# Patient Record
Sex: FEMALE | Race: WHITE | URBAN - METROPOLITAN AREA
[De-identification: names, ages, dates, MRNs, and addresses within clinical notes are randomized per-mention and may not be internally consistent; named-entity substitution may affect disease eponyms.]

---

## 2019-03-13 ENCOUNTER — APPOINTMENT (OUTPATIENT)
Dept: FAMILY MEDICINE | Age: 49
End: 2019-03-13

## 2019-03-22 ENCOUNTER — OFFICE VISIT (OUTPATIENT)
Dept: FAMILY MEDICINE | Age: 49
End: 2019-03-22

## 2019-03-22 VITALS
BODY MASS INDEX: 44.93 KG/M2 | DIASTOLIC BLOOD PRESSURE: 77 MMHG | HEART RATE: 84 BPM | HEIGHT: 62 IN | OXYGEN SATURATION: 98 % | WEIGHT: 244.16 LBS | SYSTOLIC BLOOD PRESSURE: 138 MMHG | RESPIRATION RATE: 18 BRPM

## 2019-03-22 DIAGNOSIS — M25.562 ACUTE PAIN OF BOTH KNEES: Primary | ICD-10-CM

## 2019-03-22 DIAGNOSIS — M25.561 ACUTE PAIN OF BOTH KNEES: Primary | ICD-10-CM

## 2019-03-22 PROCEDURE — 99204 OFFICE O/P NEW MOD 45 MIN: CPT | Performed by: FAMILY MEDICINE

## 2019-03-22 RX ORDER — NAPROXEN 500 MG/1
500 TABLET ORAL 2 TIMES DAILY
Qty: 30 TABLET | Refills: 1 | Status: SHIPPED | OUTPATIENT
Start: 2019-03-22 | End: 2019-04-06

## 2019-03-22 SDOH — HEALTH STABILITY: MENTAL HEALTH: HOW OFTEN DO YOU HAVE A DRINK CONTAINING ALCOHOL?: NEVER

## 2019-03-22 ASSESSMENT — ENCOUNTER SYMPTOMS
ABDOMINAL PAIN: 0
CONFUSION: 0
FEVER: 0
HALLUCINATIONS: 0
SHORTNESS OF BREATH: 0
VOMITING: 0
TREMORS: 0
SEIZURES: 0

## 2020-02-28 ENCOUNTER — OFFICE VISIT (OUTPATIENT)
Dept: FAMILY MEDICINE | Age: 50
End: 2020-02-28

## 2020-02-28 ENCOUNTER — IMAGING SERVICES (OUTPATIENT)
Dept: MAMMOGRAPHY | Age: 50
End: 2020-02-28
Attending: FAMILY MEDICINE

## 2020-02-28 ENCOUNTER — LAB SERVICES (OUTPATIENT)
Dept: LAB | Age: 50
End: 2020-02-28

## 2020-02-28 VITALS
HEART RATE: 64 BPM | RESPIRATION RATE: 20 BRPM | TEMPERATURE: 97.9 F | BODY MASS INDEX: 47 KG/M2 | WEIGHT: 255.4 LBS | HEIGHT: 62 IN | DIASTOLIC BLOOD PRESSURE: 66 MMHG | SYSTOLIC BLOOD PRESSURE: 131 MMHG

## 2020-02-28 DIAGNOSIS — Z12.31 VISIT FOR SCREENING MAMMOGRAM: ICD-10-CM

## 2020-02-28 DIAGNOSIS — Z00.00 HEALTH MAINTENANCE EXAMINATION: ICD-10-CM

## 2020-02-28 DIAGNOSIS — Z81.8 FAMILY HISTORY OF DEMENTIA: Primary | ICD-10-CM

## 2020-02-28 DIAGNOSIS — Z23 NEED FOR VACCINATION: ICD-10-CM

## 2020-02-28 DIAGNOSIS — Z81.8 FAMILY HISTORY OF DEMENTIA: ICD-10-CM

## 2020-02-28 DIAGNOSIS — Z12.11 COLON CANCER SCREENING: ICD-10-CM

## 2020-02-28 LAB
ALBUMIN SERPL-MCNC: 3.6 G/DL (ref 3.6–5.1)
ALBUMIN/GLOB SERPL: 0.9 {RATIO} (ref 1–2.4)
ALP SERPL-CCNC: 89 UNITS/L (ref 45–117)
ALT SERPL-CCNC: 23 UNITS/L
ANION GAP SERPL CALC-SCNC: 8 MMOL/L (ref 10–20)
AST SERPL-CCNC: 11 UNITS/L
BASOPHILS # BLD: 0.1 K/MCL (ref 0–0.3)
BASOPHILS NFR BLD: 1 %
BILIRUB SERPL-MCNC: 0.2 MG/DL (ref 0.2–1)
BUN SERPL-MCNC: 14 MG/DL (ref 6–20)
BUN/CREAT SERPL: 22 (ref 7–25)
CALCIUM SERPL-MCNC: 9.7 MG/DL (ref 8.4–10.2)
CHLORIDE SERPL-SCNC: 106 MMOL/L (ref 98–107)
CO2 SERPL-SCNC: 28 MMOL/L (ref 21–32)
CREAT SERPL-MCNC: 0.64 MG/DL (ref 0.51–0.95)
DIFFERENTIAL METHOD BLD: NORMAL
EOSINOPHIL # BLD: 0.3 K/MCL (ref 0.1–0.5)
EOSINOPHIL NFR BLD: 3 %
ERYTHROCYTE [DISTWIDTH] IN BLOOD: 12.8 % (ref 11–15)
GLOBULIN SER-MCNC: 4 G/DL (ref 2–4)
GLUCOSE SERPL-MCNC: 94 MG/DL (ref 65–99)
HCT VFR BLD CALC: 40.6 % (ref 36–46.5)
HGB BLD-MCNC: 13.1 G/DL (ref 12–15.5)
IMM GRANULOCYTES # BLD AUTO: 0 K/MCL (ref 0–0.2)
IMM GRANULOCYTES NFR BLD: 0 %
LENGTH OF FAST TIME PATIENT: 4 HRS
LYMPHOCYTES # BLD: 1.9 K/MCL (ref 1–4)
LYMPHOCYTES NFR BLD: 20 %
MCH RBC QN AUTO: 28.9 PG (ref 26–34)
MCHC RBC AUTO-ENTMCNC: 32.3 G/DL (ref 32–36.5)
MCV RBC AUTO: 89.4 FL (ref 78–100)
MONOCYTES # BLD: 0.7 K/MCL (ref 0.3–0.9)
MONOCYTES NFR BLD: 8 %
NEUTROPHILS # BLD: 6.4 K/MCL (ref 1.8–7.7)
NEUTROPHILS NFR BLD: 68 %
NRBC BLD MANUAL-RTO: 0 /100 WBC
PLATELET # BLD: 367 K/MCL (ref 140–450)
POTASSIUM SERPL-SCNC: 4.5 MMOL/L (ref 3.4–5.1)
PROT SERPL-MCNC: 7.6 G/DL (ref 6.4–8.2)
RBC # BLD: 4.54 MIL/MCL (ref 4–5.2)
SODIUM SERPL-SCNC: 138 MMOL/L (ref 135–145)
WBC # BLD: 9.4 K/MCL (ref 4.2–11)

## 2020-02-28 PROCEDURE — 77067 SCR MAMMO BI INCL CAD: CPT | Performed by: EMERGENCY MEDICINE

## 2020-02-28 PROCEDURE — 99396 PREV VISIT EST AGE 40-64: CPT | Performed by: FAMILY MEDICINE

## 2020-02-28 PROCEDURE — 80053 COMPREHEN METABOLIC PANEL: CPT | Performed by: FAMILY MEDICINE

## 2020-02-28 PROCEDURE — 85025 COMPLETE CBC W/AUTO DIFF WBC: CPT | Performed by: FAMILY MEDICINE

## 2020-02-28 PROCEDURE — 77063 BREAST TOMOSYNTHESIS BI: CPT | Performed by: EMERGENCY MEDICINE

## 2020-02-28 PROCEDURE — 36415 COLL VENOUS BLD VENIPUNCTURE: CPT | Performed by: FAMILY MEDICINE

## 2020-02-28 SDOH — HEALTH STABILITY: MENTAL HEALTH: HOW OFTEN DO YOU HAVE A DRINK CONTAINING ALCOHOL?: NEVER

## 2020-02-28 ASSESSMENT — ENCOUNTER SYMPTOMS
TREMORS: 0
HALLUCINATIONS: 0
SEIZURES: 0
VOMITING: 0
ABDOMINAL PAIN: 0
SHORTNESS OF BREATH: 0
FEVER: 0
CONFUSION: 0

## 2020-03-03 ENCOUNTER — TELEPHONE (OUTPATIENT)
Dept: FAMILY MEDICINE | Age: 50
End: 2020-03-03

## 2020-03-03 DIAGNOSIS — R92.8 ABNORMAL MAMMOGRAM: Primary | ICD-10-CM

## 2020-03-12 ENCOUNTER — TELEPHONE (OUTPATIENT)
Dept: FAMILY MEDICINE | Age: 50
End: 2020-03-12

## 2020-03-12 ENCOUNTER — HOSPITAL (OUTPATIENT)
Dept: OTHER | Age: 50
End: 2020-03-12
Attending: FAMILY MEDICINE

## 2020-03-13 ENCOUNTER — TELEPHONE (OUTPATIENT)
Dept: FAMILY MEDICINE | Age: 50
End: 2020-03-13

## 2020-03-27 DIAGNOSIS — R92.8 ABNORMAL MAMMOGRAM: ICD-10-CM

## 2020-07-08 ENCOUNTER — TELEPHONE (OUTPATIENT)
Dept: FAMILY MEDICINE | Age: 50
End: 2020-07-08

## 2020-09-25 ENCOUNTER — IMMUNIZATION (OUTPATIENT)
Dept: FAMILY MEDICINE | Age: 50
End: 2020-09-25

## 2020-09-25 VITALS — TEMPERATURE: 98.8 F

## 2020-09-25 DIAGNOSIS — Z23 NEED FOR IMMUNIZATION AGAINST INFLUENZA: Primary | ICD-10-CM

## 2020-09-25 PROCEDURE — 90686 IIV4 VACC NO PRSV 0.5 ML IM: CPT

## 2020-09-25 PROCEDURE — 90471 IMMUNIZATION ADMIN: CPT

## 2021-02-04 ENCOUNTER — TELEPHONE (OUTPATIENT)
Dept: FAMILY MEDICINE | Age: 51
End: 2021-02-04

## 2022-04-14 LAB
CYTOLOGY CVX/VAG DOC THIN PREP: NORMAL
HPV16+18+45 E6+E7MRNA CVX NAA+PROBE: NEGATIVE

## 2022-05-31 ENCOUNTER — TELEPHONE (OUTPATIENT)
Dept: OBGYN | Age: 52
End: 2022-05-31

## 2022-07-25 ENCOUNTER — APPOINTMENT (OUTPATIENT)
Dept: OBGYN | Age: 52
End: 2022-07-25

## 2023-07-25 ENCOUNTER — CLINICAL ABSTRACT (OUTPATIENT)
Dept: CARE COORDINATION | Age: 53
End: 2023-07-25

## 2024-10-28 ENCOUNTER — HOSPITAL ENCOUNTER (INPATIENT)
Facility: HOSPITAL | Age: 54
LOS: 1 days | Discharge: HOME | DRG: 418 | End: 2024-10-30
Attending: EMERGENCY MEDICINE | Admitting: HOSPITALIST
Payer: COMMERCIAL

## 2024-10-28 ENCOUNTER — APPOINTMENT (OUTPATIENT)
Dept: ULTRASOUND IMAGING | Age: 54
DRG: 418 | End: 2024-10-28
Attending: EMERGENCY MEDICINE
Payer: COMMERCIAL

## 2024-10-28 ENCOUNTER — APPOINTMENT (OUTPATIENT)
Dept: ULTRASOUND IMAGING | Age: 54
End: 2024-10-28
Attending: EMERGENCY MEDICINE
Payer: COMMERCIAL

## 2024-10-28 ENCOUNTER — HOSPITAL ENCOUNTER (INPATIENT)
Facility: HOSPITAL | Age: 54
LOS: 1 days | Discharge: HOME | End: 2024-10-30
Attending: EMERGENCY MEDICINE | Admitting: HOSPITALIST
Payer: COMMERCIAL

## 2024-10-28 DIAGNOSIS — K85.90 ACUTE PANCREATITIS, UNSPECIFIED COMPLICATION STATUS, UNSPECIFIED PANCREATITIS TYPE (HCC): Primary | ICD-10-CM

## 2024-10-28 DIAGNOSIS — K80.20 CHOLELITHIASIS: ICD-10-CM

## 2024-10-28 PROBLEM — R10.13 EPIGASTRIC PAIN: Status: ACTIVE | Noted: 2024-10-28

## 2024-10-28 LAB
ALBUMIN SERPL-MCNC: 3.8 G/DL (ref 3.4–5)
ALBUMIN/GLOB SERPL: 0.9 {RATIO} (ref 1–2)
ALP LIVER SERPL-CCNC: 112 U/L
ALT SERPL-CCNC: 135 U/L
ANION GAP SERPL CALC-SCNC: 7 MMOL/L (ref 0–18)
AST SERPL-CCNC: 117 U/L (ref 15–37)
BASOPHILS # BLD AUTO: 0.04 X10(3) UL (ref 0–0.2)
BASOPHILS NFR BLD AUTO: 0.3 %
BILIRUB SERPL-MCNC: 1.1 MG/DL (ref 0.1–2)
BILIRUB UR QL CFM: NEGATIVE
BUN BLD-MCNC: 22 MG/DL (ref 9–23)
CALCIUM BLD-MCNC: 10 MG/DL (ref 8.5–10.1)
CHLORIDE SERPL-SCNC: 104 MMOL/L (ref 98–112)
CLARITY UR REFRACT.AUTO: CLEAR
CO2 SERPL-SCNC: 24 MMOL/L (ref 21–32)
COLOR UR AUTO: YELLOW
CREAT BLD-MCNC: 0.82 MG/DL
EGFRCR SERPLBLD CKD-EPI 2021: 84 ML/MIN/1.73M2 (ref 60–?)
EOSINOPHIL # BLD AUTO: 0.01 X10(3) UL (ref 0–0.7)
EOSINOPHIL NFR BLD AUTO: 0.1 %
ERYTHROCYTE [DISTWIDTH] IN BLOOD BY AUTOMATED COUNT: 13 %
GLOBULIN PLAS-MCNC: 4.3 G/DL (ref 2.8–4.4)
GLUCOSE BLD-MCNC: 134 MG/DL (ref 70–99)
GLUCOSE UR STRIP.AUTO-MCNC: NEGATIVE MG/DL
HCT VFR BLD AUTO: 41 %
HGB BLD-MCNC: 14.6 G/DL
IMM GRANULOCYTES # BLD AUTO: 0.07 X10(3) UL (ref 0–1)
IMM GRANULOCYTES NFR BLD: 0.5 %
KETONES UR STRIP.AUTO-MCNC: 40 MG/DL
LEUKOCYTE ESTERASE UR QL STRIP.AUTO: NEGATIVE
LIPASE SERPL-CCNC: 8867 U/L (ref 12–53)
LYMPHOCYTES # BLD AUTO: 0.84 X10(3) UL (ref 1–4)
LYMPHOCYTES NFR BLD AUTO: 6.3 %
MCH RBC QN AUTO: 30.6 PG (ref 26–34)
MCHC RBC AUTO-ENTMCNC: 35.6 G/DL (ref 31–37)
MCV RBC AUTO: 86 FL
MONOCYTES # BLD AUTO: 0.49 X10(3) UL (ref 0.1–1)
MONOCYTES NFR BLD AUTO: 3.7 %
NEUTROPHILS # BLD AUTO: 11.81 X10 (3) UL (ref 1.5–7.7)
NEUTROPHILS # BLD AUTO: 11.81 X10(3) UL (ref 1.5–7.7)
NEUTROPHILS NFR BLD AUTO: 89.1 %
NITRITE UR QL STRIP.AUTO: NEGATIVE
OSMOLALITY SERPL CALC.SUM OF ELEC: 285 MOSM/KG (ref 275–295)
PH UR STRIP.AUTO: 6.5 [PH] (ref 5–8)
PLATELET # BLD AUTO: 296 10(3)UL (ref 150–450)
POTASSIUM SERPL-SCNC: 3.4 MMOL/L (ref 3.5–5.1)
PROT SERPL-MCNC: 8.1 G/DL (ref 6.4–8.2)
RBC # BLD AUTO: 4.77 X10(6)UL
RBC UR QL AUTO: NEGATIVE
SODIUM SERPL-SCNC: 135 MMOL/L (ref 136–145)
SP GR UR STRIP.AUTO: 1.02 (ref 1–1.03)
TROPONIN I SERPL HS-MCNC: 13 NG/L
UROBILINOGEN UR STRIP.AUTO-MCNC: 1 MG/DL
WBC # BLD AUTO: 13.3 X10(3) UL (ref 4–11)

## 2024-10-28 PROCEDURE — 76700 US EXAM ABDOM COMPLETE: CPT | Performed by: EMERGENCY MEDICINE

## 2024-10-28 PROCEDURE — 99223 1ST HOSP IP/OBS HIGH 75: CPT | Performed by: STUDENT IN AN ORGANIZED HEALTH CARE EDUCATION/TRAINING PROGRAM

## 2024-10-28 RX ORDER — HYDROMORPHONE HYDROCHLORIDE 1 MG/ML
0.5 INJECTION, SOLUTION INTRAMUSCULAR; INTRAVENOUS; SUBCUTANEOUS EVERY 30 MIN PRN
Status: DISCONTINUED | OUTPATIENT
Start: 2024-10-28 | End: 2024-10-29

## 2024-10-28 RX ORDER — MORPHINE SULFATE 4 MG/ML
4 INJECTION, SOLUTION INTRAMUSCULAR; INTRAVENOUS ONCE
Status: COMPLETED | OUTPATIENT
Start: 2024-10-28 | End: 2024-10-28

## 2024-10-28 RX ORDER — SODIUM CHLORIDE, SODIUM LACTATE, POTASSIUM CHLORIDE, CALCIUM CHLORIDE 600; 310; 30; 20 MG/100ML; MG/100ML; MG/100ML; MG/100ML
INJECTION, SOLUTION INTRAVENOUS ONCE
Status: COMPLETED | OUTPATIENT
Start: 2024-10-28 | End: 2024-10-29

## 2024-10-28 RX ORDER — KETOROLAC TROMETHAMINE 15 MG/ML
15 INJECTION, SOLUTION INTRAMUSCULAR; INTRAVENOUS ONCE
Status: COMPLETED | OUTPATIENT
Start: 2024-10-28 | End: 2024-10-28

## 2024-10-28 RX ORDER — ONDANSETRON 2 MG/ML
4 INJECTION INTRAMUSCULAR; INTRAVENOUS ONCE
Status: DISCONTINUED | OUTPATIENT
Start: 2024-10-28 | End: 2024-10-28

## 2024-10-28 RX ORDER — FAMOTIDINE 10 MG/ML
20 INJECTION, SOLUTION INTRAVENOUS ONCE
Status: COMPLETED | OUTPATIENT
Start: 2024-10-28 | End: 2024-10-28

## 2024-10-28 RX ORDER — ONDANSETRON 2 MG/ML
4 INJECTION INTRAMUSCULAR; INTRAVENOUS ONCE
Status: COMPLETED | OUTPATIENT
Start: 2024-10-28 | End: 2024-10-28

## 2024-10-28 NOTE — ED INITIAL ASSESSMENT (HPI)
C/O upper abd pain. States was at [facility] yesterday for same. Had blood work, EKG and ct. No dx given, no prescriptions. States pain returned at 3 am. C/O nausea and vomiting. Denies changes in urine or stool.

## 2024-10-29 PROBLEM — K85.90 ACUTE PANCREATITIS, UNSPECIFIED COMPLICATION STATUS, UNSPECIFIED PANCREATITIS TYPE (HCC): Status: ACTIVE | Noted: 2024-10-29

## 2024-10-29 LAB
ALBUMIN SERPL-MCNC: 3.7 G/DL (ref 3.2–4.8)
ALBUMIN/GLOB SERPL: 1.3 {RATIO} (ref 1–2)
ALP LIVER SERPL-CCNC: 92 U/L
ALT SERPL-CCNC: 122 U/L
ANION GAP SERPL CALC-SCNC: 5 MMOL/L (ref 0–18)
AST SERPL-CCNC: 113 U/L (ref ?–34)
ATRIAL RATE: 58 BPM
BASOPHILS # BLD AUTO: 0.03 X10(3) UL (ref 0–0.2)
BASOPHILS NFR BLD AUTO: 0.3 %
BILIRUB SERPL-MCNC: 1.2 MG/DL (ref 0.3–1.2)
BUN BLD-MCNC: 21 MG/DL (ref 9–23)
CALCIUM BLD-MCNC: 9.1 MG/DL (ref 8.7–10.4)
CHLORIDE SERPL-SCNC: 110 MMOL/L (ref 98–112)
CO2 SERPL-SCNC: 24 MMOL/L (ref 21–32)
CREAT BLD-MCNC: 0.68 MG/DL
EGFRCR SERPLBLD CKD-EPI 2021: 102 ML/MIN/1.73M2 (ref 60–?)
EOSINOPHIL # BLD AUTO: 0 X10(3) UL (ref 0–0.7)
EOSINOPHIL NFR BLD AUTO: 0 %
ERYTHROCYTE [DISTWIDTH] IN BLOOD BY AUTOMATED COUNT: 13 %
GLOBULIN PLAS-MCNC: 2.9 G/DL (ref 2–3.5)
GLUCOSE BLD-MCNC: 120 MG/DL (ref 70–99)
HCT VFR BLD AUTO: 36.5 %
HGB BLD-MCNC: 12.2 G/DL
IMM GRANULOCYTES # BLD AUTO: 0.04 X10(3) UL (ref 0–1)
IMM GRANULOCYTES NFR BLD: 0.4 %
LYMPHOCYTES # BLD AUTO: 1.21 X10(3) UL (ref 1–4)
LYMPHOCYTES NFR BLD AUTO: 11.7 %
MAGNESIUM SERPL-MCNC: 2 MG/DL (ref 1.6–2.6)
MCH RBC QN AUTO: 29.9 PG (ref 26–34)
MCHC RBC AUTO-ENTMCNC: 33.4 G/DL (ref 31–37)
MCV RBC AUTO: 89.5 FL
MONOCYTES # BLD AUTO: 0.78 X10(3) UL (ref 0.1–1)
MONOCYTES NFR BLD AUTO: 7.6 %
NEUTROPHILS # BLD AUTO: 8.25 X10 (3) UL (ref 1.5–7.7)
NEUTROPHILS # BLD AUTO: 8.25 X10(3) UL (ref 1.5–7.7)
NEUTROPHILS NFR BLD AUTO: 80 %
OSMOLALITY SERPL CALC.SUM OF ELEC: 292 MOSM/KG (ref 275–295)
P AXIS: 31 DEGREES
P-R INTERVAL: 154 MS
PLATELET # BLD AUTO: 240 10(3)UL (ref 150–450)
POTASSIUM SERPL-SCNC: 3.4 MMOL/L (ref 3.5–5.1)
PROT SERPL-MCNC: 6.6 G/DL (ref 5.7–8.2)
Q-T INTERVAL: 480 MS
QRS DURATION: 134 MS
QTC CALCULATION (BEZET): 471 MS
R AXIS: -52 DEGREES
RBC # BLD AUTO: 4.08 X10(6)UL
SODIUM SERPL-SCNC: 139 MMOL/L (ref 136–145)
T AXIS: 59 DEGREES
TRIGL SERPL-MCNC: 72 MG/DL (ref 30–149)
TRIGL SERPL-MCNC: 80 MG/DL (ref 30–149)
VENTRICULAR RATE: 58 BPM
WBC # BLD AUTO: 10.3 X10(3) UL (ref 4–11)

## 2024-10-29 RX ORDER — HYDROMORPHONE HYDROCHLORIDE 1 MG/ML
0.4 INJECTION, SOLUTION INTRAMUSCULAR; INTRAVENOUS; SUBCUTANEOUS EVERY 4 HOURS PRN
Status: DISCONTINUED | OUTPATIENT
Start: 2024-10-29 | End: 2024-10-30

## 2024-10-29 RX ORDER — ECHINACEA PURPUREA EXTRACT 125 MG
1 TABLET ORAL
Status: DISCONTINUED | OUTPATIENT
Start: 2024-10-29 | End: 2024-10-30

## 2024-10-29 RX ORDER — BISACODYL 10 MG
10 SUPPOSITORY, RECTAL RECTAL
Status: DISCONTINUED | OUTPATIENT
Start: 2024-10-29 | End: 2024-10-30

## 2024-10-29 RX ORDER — ONDANSETRON 2 MG/ML
4 INJECTION INTRAMUSCULAR; INTRAVENOUS EVERY 6 HOURS PRN
Status: DISCONTINUED | OUTPATIENT
Start: 2024-10-29 | End: 2024-10-30

## 2024-10-29 RX ORDER — MULTIVIT-MIN/IRON/FOLIC ACID/K 18-600-40
2000 CAPSULE ORAL DAILY
COMMUNITY

## 2024-10-29 RX ORDER — PROCHLORPERAZINE EDISYLATE 5 MG/ML
5 INJECTION INTRAMUSCULAR; INTRAVENOUS EVERY 8 HOURS PRN
Status: DISCONTINUED | OUTPATIENT
Start: 2024-10-29 | End: 2024-10-30

## 2024-10-29 RX ORDER — MULTIVIT-MIN/IRON FUM/FOLIC AC 7.5 MG-4
1 TABLET ORAL DAILY
COMMUNITY

## 2024-10-29 RX ORDER — POLYETHYLENE GLYCOL 3350 17 G/17G
17 POWDER, FOR SOLUTION ORAL DAILY PRN
Status: DISCONTINUED | OUTPATIENT
Start: 2024-10-29 | End: 2024-10-30

## 2024-10-29 RX ORDER — SENNOSIDES 8.6 MG
17.2 TABLET ORAL NIGHTLY PRN
Status: DISCONTINUED | OUTPATIENT
Start: 2024-10-29 | End: 2024-10-30

## 2024-10-29 RX ORDER — ACETAMINOPHEN 500 MG
500 TABLET ORAL EVERY 4 HOURS PRN
Status: DISCONTINUED | OUTPATIENT
Start: 2024-10-29 | End: 2024-10-30

## 2024-10-29 RX ORDER — MULTIPLE VITAMINS W/ MINERALS TAB 9MG-400MCG
1 TAB ORAL DAILY
Status: DISCONTINUED | OUTPATIENT
Start: 2024-10-29 | End: 2024-10-30

## 2024-10-29 RX ORDER — CHOLECALCIFEROL (VITAMIN D3) 25 MCG
2000 TABLET ORAL DAILY
Status: DISCONTINUED | OUTPATIENT
Start: 2024-10-29 | End: 2024-10-30

## 2024-10-29 RX ORDER — SODIUM CHLORIDE 9 MG/ML
INJECTION, SOLUTION INTRAVENOUS CONTINUOUS
Status: DISCONTINUED | OUTPATIENT
Start: 2024-10-29 | End: 2024-10-30

## 2024-10-29 RX ORDER — POTASSIUM CHLORIDE 1500 MG/1
40 TABLET, EXTENDED RELEASE ORAL ONCE
Status: COMPLETED | OUTPATIENT
Start: 2024-10-29 | End: 2024-10-29

## 2024-10-29 RX ORDER — BENZONATATE 200 MG/1
200 CAPSULE ORAL 3 TIMES DAILY PRN
Status: DISCONTINUED | OUTPATIENT
Start: 2024-10-29 | End: 2024-10-30

## 2024-10-29 RX ORDER — HYDROMORPHONE HYDROCHLORIDE 1 MG/ML
0.5 INJECTION, SOLUTION INTRAMUSCULAR; INTRAVENOUS; SUBCUTANEOUS ONCE
Status: COMPLETED | OUTPATIENT
Start: 2024-10-29 | End: 2024-10-29

## 2024-10-29 RX ORDER — HYDROMORPHONE HYDROCHLORIDE 1 MG/ML
0.8 INJECTION, SOLUTION INTRAMUSCULAR; INTRAVENOUS; SUBCUTANEOUS EVERY 4 HOURS PRN
Status: DISCONTINUED | OUTPATIENT
Start: 2024-10-29 | End: 2024-10-30

## 2024-10-29 RX ORDER — SODIUM PHOSPHATE, DIBASIC AND SODIUM PHOSPHATE, MONOBASIC 7; 19 G/230ML; G/230ML
1 ENEMA RECTAL ONCE AS NEEDED
Status: DISCONTINUED | OUTPATIENT
Start: 2024-10-29 | End: 2024-10-30

## 2024-10-29 RX ORDER — ENOXAPARIN SODIUM 100 MG/ML
40 INJECTION SUBCUTANEOUS DAILY
Status: DISCONTINUED | OUTPATIENT
Start: 2024-10-29 | End: 2024-10-30

## 2024-10-29 RX ORDER — HYDROMORPHONE HYDROCHLORIDE 1 MG/ML
0.2 INJECTION, SOLUTION INTRAMUSCULAR; INTRAVENOUS; SUBCUTANEOUS EVERY 4 HOURS PRN
Status: DISCONTINUED | OUTPATIENT
Start: 2024-10-29 | End: 2024-10-30

## 2024-10-29 NOTE — H&P
[facility]     Valeria Hutchins Patient Status:  Inpatient    1970 MRN [id number]   Location [facility] 0SW-A Attending Mak Powell*   Hosp Day # 0 PCP None Pcp     Chief Complaint: abdominal pain    Subjective:    History of Present Illness:     Valeria Hutchins is a 54 year old female with no PMHx who presented to the hospital for abdominal pain . Her pain began 2 days ago and was a sharp knifing pain in her epigastric region that radiated to her back. It was rated 4-10/10 with no specific alleviating or exacerbating factors. She had associated nausea without emesis and was not able to eat or drink much as a result. She denied any fever, chills, diarrhea, constipation. She denied prior issues with her gallbladder.    History/Other:    Past Medical History:  History reviewed. No pertinent past medical history.  Past Surgical History:   History reviewed. No pertinent surgical history.   Family History:   History reviewed. No pertinent family history.  Social History:    reports that she has never smoked. She has never used smokeless tobacco. She reports that she does not drink alcohol and does not use drugs.     Allergies: Allergies[1]    Medications:  Medications Ordered Prior to Encounter[2]    Review of Systems:   A comprehensive review of systems was completed.    Pertinent positives and negatives noted in the HPI.    Objective:   Physical Exam:    /54   Pulse 66   Temp 97.8 °F (36.6 °C) (Oral)   Resp 17   Ht 5' 2\" (1.575 m)   Wt 216 lb (98 kg)   SpO2 97%   BMI 39.51 kg/m²   General: No acute distress, Alert  Respiratory: No rhonchi, no wheezes, on room  air  Cardiovascular: S1, S2. Regular rate and rhythm  Abdomen: Soft, Non-tender, non-distended, positive bowel sounds  Neuro: No new focal deficits  Extremities: trace bl pre-tibial edema    Results:    Labs:      Labs Last 24 Hours:    Recent Labs   Lab 10/28/24  1939   RBC 4.77   HGB 14.6   HCT  41.0   MCV 86.0   MCH 30.6   MCHC 35.6   RDW 13.0   NEPRELIM 11.81*   WBC 13.3*   .0       Recent Labs   Lab 10/28/24  1939   *   BUN 22   CREATSERUM 0.82   EGFRCR 84   CA 10.0   ALB 3.8   *   K 3.4*      CO2 24.0   ALKPHO 112   *   *   BILT 1.1   TP 8.1       No results found for: \"PT\", \"INR\"    Recent Labs   Lab 10/28/24  1939   TROPHS 13       No results for input(s): \"TROP\", \"PBNP\" in the last 168 hours.    No results for input(s): \"PCT\" in the last 168 hours.    Imaging: Imaging data reviewed in Epic.    Assessment & Plan:      #Pancreatitis  -, , lipase 8867  -US showing cholelithiasis without cholecystitis  -pancreatitis with unclear etiology, possibly 2/2 gallstones vs Sphincter of Oddi dysfunction vs other  -check TG level  -liquid diet  -pain control: dilaudid prn  -zofran prn  -cont to monitor LFT's  -pt will need eventual FU with surgery for elective cholecystectomy    #hypokalemia  -K 3.4  -replete  -cont to trend BMP  -check Mg level    #leukocytosis  -WBC 13.3  -likely 2/2 acute stress  -cont to monitor CBC        Plan of care discussed with ED physician    Siobhan Vang DO    Supplementary Documentation:     The 21st Century Cures Act makes medical notes like these available to patients in the interest of transparency. Please be advised this is a medical document. Medical documents are intended to carry relevant information, facts as evident, and the clinical opinion of the practitioner. The medical note is intended as peer to peer communication and may appear blunt or direct. It is written in medical language and may contain abbreviations or verbiage that are unfamiliar.                                       [1] No Known Allergies  [2]   No current facility-administered medications on file prior to encounter.     Current Outpatient Medications on File Prior to Encounter   Medication Sig Dispense Refill    Multiple Vitamins-Minerals  (MULTI-VITAMIN/MINERALS) Oral Tab Take 1 tablet by mouth daily.      Cholecalciferol (VITAMIN D) 50 MCG (2000 UT) Oral Cap Take 1 capsule (2,000 Units total) by mouth daily.

## 2024-10-29 NOTE — PLAN OF CARE
Pt from home w/ children  Aox4  VSS on RA/2L NOC  afebrile  no tele, receiving lovenox  Electrolyte non-cardiac replacement  continent  Up self, call light within reach  Clear liquid diet  Receiving IVF  PRN pain and nausea meds given as needed, see MAR  Pt updated on current POC, no questions at this time    Problem: Patient/Family Goals  Goal: Patient/Family Long Term Goal  Description: Patient's Long Term Goal: DC    Interventions:  - IVF  - Pain management  - See additional Care Plan goals for specific interventions  Outcome: Progressing  Goal: Patient/Family Short Term Goal  Description: Patient's Short Term Goal: 10/28 noc: sleep    Interventions:   - Cluster care  - See additional Care Plan goals for specific interventions  Outcome: Progressing     Problem: PAIN - ADULT  Goal: Verbalizes/displays adequate comfort level or patient's stated pain goal  Description: INTERVENTIONS:  - Encourage pt to monitor pain and request assistance  - Assess pain using appropriate pain scale  - Administer analgesics based on type and severity of pain and evaluate response  - Implement non-pharmacological measures as appropriate and evaluate response  - Consider cultural and social influences on pain and pain management  - Manage/alleviate anxiety  - Utilize distraction and/or relaxation techniques  - Monitor for opioid side effects  - Notify MD/LIP if interventions unsuccessful or patient reports new pain  - Anticipate increased pain with activity and pre-medicate as appropriate  Outcome: Progressing     Problem: GASTROINTESTINAL - ADULT  Goal: Minimal or absence of nausea and vomiting  Description: INTERVENTIONS:  - Maintain adequate hydration with IV or PO as ordered and tolerated  - Nasogastric tube to low intermittent suction as ordered  - Evaluate effectiveness of ordered antiemetic medications  - Provide nonpharmacologic comfort measures as appropriate  - Advance diet as tolerated, if ordered  - Obtain nutritional  consult as needed  - Evaluate fluid balance  Outcome: Progressing  Goal: Maintains or returns to baseline bowel function  Description: INTERVENTIONS:  - Assess bowel function  - Maintain adequate hydration with IV or PO as ordered and tolerated  - Evaluate effectiveness of GI medications  - Encourage mobilization and activity  - Obtain nutritional consult as needed  - Establish a toileting routine/schedule  - Consider collaborating with pharmacy to review patient's medication profile  Outcome: Progressing

## 2024-10-29 NOTE — PAYOR COMM NOTE
--------------  ADMISSION REVIEW     Payor: [employer]  Subscriber #:  [id number]  Authorization Number: [id number]    Admit date: 10/29/24  Admit time:  1:08 AM       REVIEW DOCUMENTATION:     ED Provider Notes        ED Provider Notes signed by Shlomo Melendrez MD at 10/28/2024  9:19 PM       Author: Shlomo Melendrez MD Service: -- Author Type: Physician    Filed: 10/28/2024  9:19 PM Date of Service: 10/28/2024  9:17 PM Status: Signed    : Shlomo Melendrez MD (Physician)           Patient Seen in: [facility] [place]      History     Chief Complaint   Patient presents with    Abdomen/Flank Pain     Stated Complaint: abd pain    Subjective:   HPI      Worsening epigastric abdominal pain associate with nausea and vomiting for the last couple days.  Went to [facility] yesterday had a workup done including CTA chest, CT abdomen pelvis, lab work including a lipase and troponin which were all negative.  However pain is worsening so she came here.          Physical Exam     ED Triage Vitals [10/28/24 1821]   /75   Pulse 79   Resp 24   Temp 98.5 °F (36.9 °C)   Temp src Oral   SpO2 95 %   O2 Device None (Room air)       Current Vitals:   Vital Signs  BP: (!) 164/70  Pulse: 60  Resp: 21  Temp: 98 °F (36.7 °C)  Temp src: Oral    Oxygen Therapy  SpO2: 98 %  O2 Device: None (Room air)        Physical Exam    Constitutional: Awake, alert, age appearing, non-toxic  Head: Normocephalic and atraumatic.     Eyes: EOM are normal. Pupils are equal, round, and reactive to light.   Neck: Normal range of motion. Neck supple. No JVD present.     Cardiovascular: Normal rate and regular rhythm.  Normal peripheral perfusion with good color.  Pulmonary/Chest: Normal effort.  No accessory muscle use.  No clubbing, no cyanosis.  Abdominal: Soft. There is no tenderness. There is no guarding.     Musculoskeletal: No swelling, deformity or ecchymosis.    Neurological: Pt is alert and oriented to person, place, and  time. No cranial nerve deficit.     Skin: Skin is warm and dry.   Psychiatric: Normal mood and affect. Thought content normal.   Tender to palpation epigastrium no peritoneal signs not distended    ED Course     Labs Reviewed   COMP METABOLIC PANEL (14) - Abnormal; Notable for the following components:       Result Value    Glucose 134 (*)     Sodium 135 (*)     Potassium 3.4 (*)      (*)      (*)     A/G Ratio 0.9 (*)     All other components within normal limits   CBC WITH DIFFERENTIAL WITH PLATELET - Abnormal; Notable for the following components:    WBC 13.3 (*)     Neutrophil Absolute Prelim 11.81 (*)     Neutrophil Absolute 11.81 (*)     Lymphocyte Absolute 0.84 (*)     All other components within normal limits   URINALYSIS WITH CULTURE REFLEX - Abnormal; Notable for the following components:    Ketones Urine 40 (*)     Protein Urine 100 mg/dL (*)     Urobilinogen Urine 1.0 (*)     All other components within normal limits   UA MICROSCOPIC ONLY, URINE - Abnormal; Notable for the following components:    Bacteria Urine Rare (*)     Squamous Epi. Cells Few (*)     All other components within normal limits   TROPONIN I HIGH SENSITIVITY - Normal   ICTOTEST - Normal   LIPASE   TRIGLYCERIDES     EKG    Rate, intervals and axes as noted on EKG Report.  Rate: 58  Rhythm: Sinus Rhythm  Reading: Sinus bradycardia, left bundle.      We were able to obtain records from [facility], they are faxed to us.  Left bundle was present yesterday as well.  Troponin was negative, blood work including lipase was negative.  CT a of the chest and abdomen pelvis did not have any acute findings.  Chest x-ray was negative as well.    Blood work you have notable for slight transaminitis.  Lab is told us that the lipase is going to be high, they are working on can diluting the blood sample over and over again.  Triglycerides added on his left knee run in April.      Given transaminitis will obtain ultrasound to look for  possible biliary obstruction.      MDM          Differential diagnoses considered: Gastritis, ulcer, however given reported abnormal lipase here suspecting acute pancreatitis.      -Will need admission for hydration and pain control    Patient will be admitted primarily to the [place] hospitalist.            *Discussion of ongoing management of this patient's care included: Admitting physician  *Comorbidities contributing to the complexity of decision making: n/a  *External charts reviewed:  records today shows as described above  *Additional sources of history: n/a    Shared decision making was done by: patient, myself.          Medical Decision Making      Disposition and Plan     Clinical Impression:  1. Epigastric pain         Signed by Shlomo Melendrez MD on 10/28/2024  9:19 PM         History and Physical    H&P signed by Siobhan Vang DO at 10/29/2024  7:25 AM          Chief Complaint: abdominal pain     Subjective:    History of Present Illness:      Valeria Hutchins is a 54 year old female with no PMHx who presented to the hospital for abdominal pain . Her pain began 2 days ago and was a sharp knifing pain in her epigastric region that radiated to her back. It was rated 4-10/10 with no specific alleviating or exacerbating factors. She had associated nausea without emesis and was not able to eat or drink much as a result. She denied any fever, chills, diarrhea, constipation. She denied prior issues with her gallbladder.     Assessment & Plan:       #Pancreatitis  -, , lipase 8867  -US showing cholelithiasis without cholecystitis  -pancreatitis with unclear etiology, possibly 2/2 gallstones vs Sphincter of Oddi dysfunction vs other  -check TG level  -liquid diet  -pain control: dilaudid prn  -zofran prn  -cont to monitor LFT's  -pt will need eventual FU with surgery for elective cholecystectomy     #hypokalemia  -K 3.4  -replete  -cont to trend BMP  -check Mg level     #leukocytosis  -WBC  13.3  -likely 2/2 acute stress  -cont to monitor CBC           Plan of care discussed with ED physician     Siobhan Vang DO              MEDICATIONS ADMINISTERED IN LAST 1 DAY:  enoxaparin (Lovenox) 40 MG/0.4ML SUBQ injection 40 mg       Date Action Dose Route User    10/29/2024 1028 Given 40 mg Subcutaneous (Left Lower Abdomen) Shruthi Nolasco RN          famotidine (Pepcid) 20 mg/2mL injection 20 mg       Date Action Dose Route User    10/28/2024 1941 Given 20 mg Intravenous NakiaOlvin wayne RN          HYDROmorphone (Dilaudid) 1 MG/ML injection 0.5 mg       Date Action Dose Route User    10/28/2024 2235 Given 0.5 mg Intravenous GallawayOlvin wayne RN          HYDROmorphone (Dilaudid) 1 MG/ML injection 0.5 mg       Date Action Dose Route User    10/29/2024 0036 Given 0.5 mg Intravenous GallawayOlvin wayne RN          HYDROmorphone (Dilaudid) 1 MG/ML injection 0.8 mg       Date Action Dose Route User    10/29/2024 0242 Given 0.8 mg Intravenous Milvia Dominguez RN          ketorolac (Toradol) 15 MG/ML injection 15 mg       Date Action Dose Route User    10/28/2024 2042 Given 15 mg Intravenous Olvin Yee RN          lactated ringers infusion       Date Action Dose Route User    10/28/2024 2315 New Bag (none) Intravenous Olvin Yee RN          morphINE PF 4 MG/ML injection 4 mg       Date Action Dose Route User    10/28/2024 2042 Given 4 mg Intravenous Olvin Yee RN          ondansetron (Zofran) 4 MG/2ML injection 4 mg       Date Action Dose Route User    10/28/2024 1945 Given 4 mg Intravenous Olvin Yee RN          ondansetron (Zofran) 4 MG/2ML injection 4 mg       Date Action Dose Route User    10/29/2024 0510 Given 4 mg Intravenous Milvia Dominguez RN          potassium chloride (Klor-Con M20) tab 40 mEq       Date Action Dose Route User    10/29/2024 1025 Given 40 mEq Oral Shruthi Nolasco RN          sodium chloride 0.9% infusion       Date Action Dose Route User    10/29/2024 1029 New Bag (none)  Intravenous Shruthi Nolasco RN    10/29/2024 0145 New Bag (none) Intravenous Milvia Dominguez RN          sodium chloride 0.9 % IV bolus 1,000 mL       Date Action Dose Route User    10/28/2024 2215 New Bag 1,000 mL Intravenous Olvin Yee RN          cholecalciferol (Vitamin D3) tab 2,000 Units       Date Action Dose Route User    10/29/2024 1026 Given 2,000 Units Oral Shruthi Nolasco RN            Vitals (last day)       Date/Time Temp Pulse Resp BP SpO2 Weight O2 Device O2 Flow Rate (L/min) [facility]    10/29/24 0806 97.6 °F (36.4 °C) 51 17 128/63 98 % -- None (Room air) --     10/29/24 0440 98 °F (36.7 °C) 59 17 147/68 97 % -- Nasal cannula 2 L/min     10/29/24 0137 -- -- -- -- -- -- None (Room air) 0 L/min     10/29/24 0137 97.8 °F (36.6 °C) 60 18 153/70 94 % -- -- --     10/29/24 0118 -- -- -- -- -- 216 lb (98 kg) -- --     10/29/24 0009 97.8 °F (36.6 °C) 66 17 132/54 97 % -- None (Room air) --     10/28/24 2303 -- 55 14 -- 95 % -- Nasal cannula 2 L/min     10/28/24 2300 -- 55 18 -- 89 % -- None (Room air) --     10/28/24 2238 98.4 °F (36.9 °C) 63 13 147/64 95 % -- -- --     10/28/24 2052 -- -- -- -- -- -- None (Room air) --     10/28/24 2049 98 °F (36.7 °C) 60 21 164/70 98 % -- None (Room air) --     10/28/24 1947 -- 58 20 136/95 97 % -- -- --     10/28/24 1821 98.5 °F (36.9 °C) 79 24 136/75 95 % 216 lb (98 kg) None (Room air) -- JW

## 2024-10-29 NOTE — CM/SW NOTE
SW received order for PHQ-4. SW attempted to meet with pt at bedside, pt asleep. Anxiety/Depression resources on AVS. SW will f/u with pt.    JESUS Pinedo  Discharge Planner

## 2024-10-29 NOTE — PROGRESS NOTES
NURSING ADMISSION NOTE      Patient admitted via Ambulance  Oriented to room.  Safety precautions initiated.  Bed in low position.  Call light in reach.    Pt admitted from [place] ED w/ pancreatitis. Navigator completed w/ pt. MD paged for orders. No questions or concerns at this time.

## 2024-10-29 NOTE — SPIRITUAL CARE NOTE
Spiritual Care Visit Note    Patient Name: Valeria Hutchins Date of Spiritual Care Visit: 10/29/24   : 1970 Primary Dx: Acute pancreatitis, unspecified complication status, unspecified pancreatitis type (HCC)       Referred By: Referral From: Nurse;Patient    Spiritual Care Taxonomy:    Intended Effects: Aligning care plan with patient's values    Methods: Encourage sharing of feelings;Encourage story-telling;Explore eugene and values;Offer spiritual/Christianity support;Offer emotional support    Interventions: Acknowledge current situation;Active listening;Assist someone with Advance Directives;Explain  role;Facilitate advance care planning;Identify supportive relationship(s)    Visit Type/Summary:     - Spiritual Care: Responded to a request for spiritual care and assessed the patient for spiritual care needs. Consulted with RN prior to visit. Offered empathic listening and emotional support. Provided information regarding how to contact Spiritual Care and left a Spiritual Care information card.  - PoA: New PoAH Created: Visited patient in response to PoA for Healthcare consult/request. Created a new PoAH for Healthcare document that, per the patient, accurately reflects their wishes. Gave the patient the original PoAH document along with copies. Confirmed that the PoAH primary agent name and contact information have been entered into the Epic, Advance Directives section. A copy of the new PoAH document has been placed on the patient's paper chart.    Spiritual Care support can be requested via an Epic consult.   For urgent/immediate needs, please contact the On Call  at: [place]: ext [phone]    Rev. Inder James M.Div., M.T.S.,   Certified Grief Counseling Specialist  Advanced Practice Board Certified

## 2024-10-29 NOTE — ED PROVIDER NOTES
Patient Seen in: [facility] In [facility]      History     Chief Complaint   Patient presents with    Abdomen/Flank Pain     Stated Complaint: abd pain    Subjective:   HPI      Worsening epigastric abdominal pain associate with nausea and vomiting for the last couple days.  Went to [facility] yesterday had a workup done including CTA chest, CT abdomen pelvis, lab work including a lipase and troponin which were all negative.  However pain is worsening so she came here.      No shortness of breath no fevers no history of the same.    Objective:     History reviewed. No pertinent past medical history.           History reviewed. No pertinent surgical history.             Social History     Socioeconomic History    Marital status: Single   Tobacco Use    Smoking status: Never    Smokeless tobacco: Never   Vaping Use    Vaping status: Never Used   Substance and Sexual Activity    Alcohol use: Never    Drug use: Never                  Physical Exam     ED Triage Vitals [10/28/24 1821]   /75   Pulse 79   Resp 24   Temp 98.5 °F (36.9 °C)   Temp src Oral   SpO2 95 %   O2 Device None (Room air)       Current Vitals:   Vital Signs  BP: (!) 164/70  Pulse: 60  Resp: 21  Temp: 98 °F (36.7 °C)  Temp src: Oral    Oxygen Therapy  SpO2: 98 %  O2 Device: None (Room air)        Physical Exam    Constitutional: Awake, alert, age appearing, non-toxic  Head: Normocephalic and atraumatic.     Eyes: EOM are normal. Pupils are equal, round, and reactive to light.   Neck: Normal range of motion. Neck supple. No JVD present.     Cardiovascular: Normal rate and regular rhythm.  Normal peripheral perfusion with good color.  Pulmonary/Chest: Normal effort.  No accessory muscle use.  No clubbing, no cyanosis.  Abdominal: Soft. There is no tenderness. There is no guarding.     Musculoskeletal: No swelling, deformity or ecchymosis.    Neurological: Pt is alert and oriented to person, place, and time. No cranial nerve  deficit.     Skin: Skin is warm and dry.   Psychiatric: Normal mood and affect. Thought content normal.   Tender to palpation epigastrium no peritoneal signs not distended    ED Course     Labs Reviewed   COMP METABOLIC PANEL (14) - Abnormal; Notable for the following components:       Result Value    Glucose 134 (*)     Sodium 135 (*)     Potassium 3.4 (*)      (*)      (*)     A/G Ratio 0.9 (*)     All other components within normal limits   CBC WITH DIFFERENTIAL WITH PLATELET - Abnormal; Notable for the following components:    WBC 13.3 (*)     Neutrophil Absolute Prelim 11.81 (*)     Neutrophil Absolute 11.81 (*)     Lymphocyte Absolute 0.84 (*)     All other components within normal limits   URINALYSIS WITH CULTURE REFLEX - Abnormal; Notable for the following components:    Ketones Urine 40 (*)     Protein Urine 100 mg/dL (*)     Urobilinogen Urine 1.0 (*)     All other components within normal limits   UA MICROSCOPIC ONLY, URINE - Abnormal; Notable for the following components:    Bacteria Urine Rare (*)     Squamous Epi. Cells Few (*)     All other components within normal limits   TROPONIN I HIGH SENSITIVITY - Normal   ICTOTEST - Normal   LIPASE   TRIGLYCERIDES     EKG    Rate, intervals and axes as noted on EKG Report.  Rate: 58  Rhythm: Sinus Rhythm  Reading: Sinus bradycardia, left bundle.                We were able to obtain records from [facility], they are faxed to us.  Left bundle was present yesterday as well.  Troponin was negative, blood work including lipase was negative.  CT a of the chest and abdomen pelvis did not have any acute findings.  Chest x-ray was negative as well.    Blood work you have notable for slight transaminitis.  Lab is told us that the lipase is going to be high, they are working on can diluting the blood sample over and over again.  Triglycerides added on his left knee run in April.      Given transaminitis will obtain ultrasound to look for possible biliary  obstruction.       MDM          Differential diagnoses considered: Gastritis, ulcer, however given reported abnormal lipase here suspecting acute pancreatitis.      -Will need admission for hydration and pain control    Patient will be admitted primarily to the [place] hospitalist.            *Discussion of ongoing management of this patient's care included: Admitting physician  *Comorbidities contributing to the complexity of decision making: n/a  *External charts reviewed:  records today shows as described above  *Additional sources of history: n/a    Shared decision making was done by: patient, myself.          Medical Decision Making      Disposition and Plan     Clinical Impression:  1. Epigastric pain         Disposition:  There is no disposition on file for this visit.  There is no disposition time on file for this visit.    Follow-up:  No follow-up provider specified.        Medications Prescribed:  There are no discharge medications for this patient.          Supplementary Documentation:

## 2024-10-30 ENCOUNTER — ANESTHESIA EVENT (OUTPATIENT)
Dept: SURGERY | Facility: HOSPITAL | Age: 54
End: 2024-10-30
Payer: COMMERCIAL

## 2024-10-30 ENCOUNTER — ANESTHESIA (OUTPATIENT)
Dept: SURGERY | Facility: HOSPITAL | Age: 54
End: 2024-10-30
Payer: COMMERCIAL

## 2024-10-30 ENCOUNTER — APPOINTMENT (OUTPATIENT)
Dept: GENERAL RADIOLOGY | Facility: HOSPITAL | Age: 54
End: 2024-10-30
Attending: SURGERY
Payer: COMMERCIAL

## 2024-10-30 ENCOUNTER — APPOINTMENT (OUTPATIENT)
Dept: GENERAL RADIOLOGY | Facility: HOSPITAL | Age: 54
DRG: 418 | End: 2024-10-30
Attending: SURGERY
Payer: COMMERCIAL

## 2024-10-30 VITALS
RESPIRATION RATE: 16 BRPM | WEIGHT: 216 LBS | TEMPERATURE: 99 F | BODY MASS INDEX: 39.75 KG/M2 | HEIGHT: 62 IN | SYSTOLIC BLOOD PRESSURE: 131 MMHG | OXYGEN SATURATION: 94 % | DIASTOLIC BLOOD PRESSURE: 70 MMHG | HEART RATE: 57 BPM

## 2024-10-30 PROBLEM — K85.10 GALLSTONE PANCREATITIS (HCC): Status: ACTIVE | Noted: 2024-10-30

## 2024-10-30 PROBLEM — K80.00 CALCULUS OF GALLBLADDER WITH ACUTE CHOLECYSTITIS WITHOUT OBSTRUCTION: Status: ACTIVE | Noted: 2024-10-30

## 2024-10-30 LAB
ALBUMIN SERPL-MCNC: 3.9 G/DL (ref 3.2–4.8)
ALBUMIN/GLOB SERPL: 1.3 {RATIO} (ref 1–2)
ALP LIVER SERPL-CCNC: 97 U/L
ALT SERPL-CCNC: 86 U/L
ANION GAP SERPL CALC-SCNC: 6 MMOL/L (ref 0–18)
AST SERPL-CCNC: 40 U/L (ref ?–34)
BILIRUB SERPL-MCNC: 0.9 MG/DL (ref 0.3–1.2)
BUN BLD-MCNC: 9 MG/DL (ref 9–23)
C DIFF TOX B STL QL: NEGATIVE
CALCIUM BLD-MCNC: 9.1 MG/DL (ref 8.7–10.4)
CHLORIDE SERPL-SCNC: 107 MMOL/L (ref 98–112)
CO2 SERPL-SCNC: 26 MMOL/L (ref 21–32)
CREAT BLD-MCNC: 0.62 MG/DL
EGFRCR SERPLBLD CKD-EPI 2021: 104 ML/MIN/1.73M2 (ref 60–?)
GLOBULIN PLAS-MCNC: 2.9 G/DL (ref 2–3.5)
GLUCOSE BLD-MCNC: 93 MG/DL (ref 70–99)
LIPASE SERPL-CCNC: 107 U/L (ref 12–53)
OSMOLALITY SERPL CALC.SUM OF ELEC: 286 MOSM/KG (ref 275–295)
POTASSIUM SERPL-SCNC: 3.6 MMOL/L (ref 3.5–5.1)
POTASSIUM SERPL-SCNC: 3.8 MMOL/L (ref 3.5–5.1)
PROT SERPL-MCNC: 6.8 G/DL (ref 5.7–8.2)
SODIUM SERPL-SCNC: 139 MMOL/L (ref 136–145)

## 2024-10-30 PROCEDURE — 47563 LAPARO CHOLECYSTECTOMY/GRAPH: CPT | Performed by: SURGERY

## 2024-10-30 PROCEDURE — 99239 HOSP IP/OBS DSCHRG MGMT >30: CPT | Performed by: HOSPITALIST

## 2024-10-30 PROCEDURE — 0FT44ZZ RESECTION OF GALLBLADDER, PERCUTANEOUS ENDOSCOPIC APPROACH: ICD-10-PCS | Performed by: SURGERY

## 2024-10-30 PROCEDURE — BF131ZZ FLUOROSCOPY OF GALLBLADDER AND BILE DUCTS USING LOW OSMOLAR CONTRAST: ICD-10-PCS | Performed by: SURGERY

## 2024-10-30 PROCEDURE — 74300 X-RAY BILE DUCTS/PANCREAS: CPT | Performed by: SURGERY

## 2024-10-30 PROCEDURE — 99254 IP/OBS CNSLTJ NEW/EST MOD 60: CPT | Performed by: SURGERY

## 2024-10-30 PROCEDURE — 47563 LAPARO CHOLECYSTECTOMY/GRAPH: CPT | Performed by: PHYSICIAN ASSISTANT

## 2024-10-30 RX ORDER — NEOSTIGMINE METHYLSULFATE 1 MG/ML
INJECTION INTRAVENOUS AS NEEDED
Status: DISCONTINUED | OUTPATIENT
Start: 2024-10-30 | End: 2024-10-30 | Stop reason: SURG

## 2024-10-30 RX ORDER — ONDANSETRON 2 MG/ML
INJECTION INTRAMUSCULAR; INTRAVENOUS AS NEEDED
Status: DISCONTINUED | OUTPATIENT
Start: 2024-10-30 | End: 2024-10-30 | Stop reason: SURG

## 2024-10-30 RX ORDER — ROCURONIUM BROMIDE 10 MG/ML
INJECTION, SOLUTION INTRAVENOUS AS NEEDED
Status: DISCONTINUED | OUTPATIENT
Start: 2024-10-30 | End: 2024-10-30 | Stop reason: SURG

## 2024-10-30 RX ORDER — ONDANSETRON 2 MG/ML
4 INJECTION INTRAMUSCULAR; INTRAVENOUS EVERY 6 HOURS PRN
Status: DISCONTINUED | OUTPATIENT
Start: 2024-10-30 | End: 2024-10-30 | Stop reason: HOSPADM

## 2024-10-30 RX ORDER — DEXAMETHASONE SODIUM PHOSPHATE 4 MG/ML
VIAL (ML) INJECTION AS NEEDED
Status: DISCONTINUED | OUTPATIENT
Start: 2024-10-30 | End: 2024-10-30 | Stop reason: SURG

## 2024-10-30 RX ORDER — CEFAZOLIN SODIUM 1 G/3ML
INJECTION, POWDER, FOR SOLUTION INTRAMUSCULAR; INTRAVENOUS AS NEEDED
Status: DISCONTINUED | OUTPATIENT
Start: 2024-10-30 | End: 2024-10-30 | Stop reason: SURG

## 2024-10-30 RX ORDER — HYDROCODONE BITARTRATE AND ACETAMINOPHEN 5; 325 MG/1; MG/1
1 TABLET ORAL EVERY 6 HOURS PRN
Qty: 15 TABLET | Refills: 0 | Status: SHIPPED | OUTPATIENT
Start: 2024-10-30

## 2024-10-30 RX ORDER — MIDAZOLAM HYDROCHLORIDE 1 MG/ML
1 INJECTION INTRAMUSCULAR; INTRAVENOUS EVERY 5 MIN PRN
Status: DISCONTINUED | OUTPATIENT
Start: 2024-10-30 | End: 2024-10-30 | Stop reason: HOSPADM

## 2024-10-30 RX ORDER — OXYCODONE HYDROCHLORIDE 5 MG/1
5 TABLET ORAL EVERY 4 HOURS PRN
Status: DISCONTINUED | OUTPATIENT
Start: 2024-10-30 | End: 2024-10-30

## 2024-10-30 RX ORDER — PROCHLORPERAZINE EDISYLATE 5 MG/ML
5 INJECTION INTRAMUSCULAR; INTRAVENOUS EVERY 8 HOURS PRN
Status: DISCONTINUED | OUTPATIENT
Start: 2024-10-30 | End: 2024-10-30 | Stop reason: HOSPADM

## 2024-10-30 RX ORDER — HYDROCODONE BITARTRATE AND ACETAMINOPHEN 5; 325 MG/1; MG/1
2 TABLET ORAL ONCE AS NEEDED
Status: DISCONTINUED | OUTPATIENT
Start: 2024-10-30 | End: 2024-10-30 | Stop reason: HOSPADM

## 2024-10-30 RX ORDER — ACETAMINOPHEN 500 MG
1000 TABLET ORAL ONCE AS NEEDED
Status: DISCONTINUED | OUTPATIENT
Start: 2024-10-30 | End: 2024-10-30 | Stop reason: HOSPADM

## 2024-10-30 RX ORDER — HYDROMORPHONE HYDROCHLORIDE 1 MG/ML
0.4 INJECTION, SOLUTION INTRAMUSCULAR; INTRAVENOUS; SUBCUTANEOUS EVERY 5 MIN PRN
Status: DISCONTINUED | OUTPATIENT
Start: 2024-10-30 | End: 2024-10-30 | Stop reason: HOSPADM

## 2024-10-30 RX ORDER — MEPERIDINE HYDROCHLORIDE 25 MG/ML
12.5 INJECTION INTRAMUSCULAR; INTRAVENOUS; SUBCUTANEOUS AS NEEDED
Status: DISCONTINUED | OUTPATIENT
Start: 2024-10-30 | End: 2024-10-30 | Stop reason: HOSPADM

## 2024-10-30 RX ORDER — SODIUM CHLORIDE, SODIUM LACTATE, POTASSIUM CHLORIDE, CALCIUM CHLORIDE 600; 310; 30; 20 MG/100ML; MG/100ML; MG/100ML; MG/100ML
INJECTION, SOLUTION INTRAVENOUS CONTINUOUS
Status: DISCONTINUED | OUTPATIENT
Start: 2024-10-30 | End: 2024-10-30 | Stop reason: HOSPADM

## 2024-10-30 RX ORDER — HYDROCODONE BITARTRATE AND ACETAMINOPHEN 5; 325 MG/1; MG/1
1 TABLET ORAL ONCE AS NEEDED
Status: DISCONTINUED | OUTPATIENT
Start: 2024-10-30 | End: 2024-10-30 | Stop reason: HOSPADM

## 2024-10-30 RX ORDER — NALOXONE HYDROCHLORIDE 0.4 MG/ML
80 INJECTION, SOLUTION INTRAMUSCULAR; INTRAVENOUS; SUBCUTANEOUS AS NEEDED
Status: DISCONTINUED | OUTPATIENT
Start: 2024-10-30 | End: 2024-10-30 | Stop reason: HOSPADM

## 2024-10-30 RX ORDER — HYDROMORPHONE HYDROCHLORIDE 1 MG/ML
0.6 INJECTION, SOLUTION INTRAMUSCULAR; INTRAVENOUS; SUBCUTANEOUS EVERY 5 MIN PRN
Status: DISCONTINUED | OUTPATIENT
Start: 2024-10-30 | End: 2024-10-30 | Stop reason: HOSPADM

## 2024-10-30 RX ORDER — ONDANSETRON 2 MG/ML
INJECTION INTRAMUSCULAR; INTRAVENOUS
Status: COMPLETED
Start: 2024-10-30 | End: 2024-10-30

## 2024-10-30 RX ORDER — BUPIVACAINE HYDROCHLORIDE AND EPINEPHRINE 5; 5 MG/ML; UG/ML
INJECTION, SOLUTION EPIDURAL; INTRACAUDAL; PERINEURAL AS NEEDED
Status: DISCONTINUED | OUTPATIENT
Start: 2024-10-30 | End: 2024-10-30 | Stop reason: HOSPADM

## 2024-10-30 RX ORDER — LIDOCAINE HYDROCHLORIDE 10 MG/ML
INJECTION, SOLUTION EPIDURAL; INFILTRATION; INTRACAUDAL; PERINEURAL AS NEEDED
Status: DISCONTINUED | OUTPATIENT
Start: 2024-10-30 | End: 2024-10-30 | Stop reason: SURG

## 2024-10-30 RX ORDER — GLYCOPYRROLATE 0.2 MG/ML
INJECTION, SOLUTION INTRAMUSCULAR; INTRAVENOUS AS NEEDED
Status: DISCONTINUED | OUTPATIENT
Start: 2024-10-30 | End: 2024-10-30 | Stop reason: SURG

## 2024-10-30 RX ORDER — MIDAZOLAM HYDROCHLORIDE 1 MG/ML
INJECTION INTRAMUSCULAR; INTRAVENOUS AS NEEDED
Status: DISCONTINUED | OUTPATIENT
Start: 2024-10-30 | End: 2024-10-30 | Stop reason: SURG

## 2024-10-30 RX ORDER — HYDROMORPHONE HYDROCHLORIDE 1 MG/ML
INJECTION, SOLUTION INTRAMUSCULAR; INTRAVENOUS; SUBCUTANEOUS
Status: COMPLETED
Start: 2024-10-30 | End: 2024-10-30

## 2024-10-30 RX ORDER — HYDROMORPHONE HYDROCHLORIDE 1 MG/ML
0.2 INJECTION, SOLUTION INTRAMUSCULAR; INTRAVENOUS; SUBCUTANEOUS EVERY 5 MIN PRN
Status: DISCONTINUED | OUTPATIENT
Start: 2024-10-30 | End: 2024-10-30 | Stop reason: HOSPADM

## 2024-10-30 RX ADMIN — ONDANSETRON 4 MG: 2 INJECTION INTRAMUSCULAR; INTRAVENOUS at 14:45:00

## 2024-10-30 RX ADMIN — DEXAMETHASONE SODIUM PHOSPHATE 4 MG: 4 MG/ML VIAL (ML) INJECTION at 14:45:00

## 2024-10-30 RX ADMIN — NEOSTIGMINE METHYLSULFATE 2 MG: 1 INJECTION INTRAVENOUS at 15:26:00

## 2024-10-30 RX ADMIN — ROCURONIUM BROMIDE 40 MG: 10 INJECTION, SOLUTION INTRAVENOUS at 14:17:00

## 2024-10-30 RX ADMIN — MIDAZOLAM HYDROCHLORIDE 2 MG: 1 INJECTION INTRAMUSCULAR; INTRAVENOUS at 14:10:00

## 2024-10-30 RX ADMIN — CEFAZOLIN SODIUM 2 G: 1 INJECTION, POWDER, FOR SOLUTION INTRAMUSCULAR; INTRAVENOUS at 14:22:00

## 2024-10-30 RX ADMIN — GLYCOPYRROLATE 0.4 MG: 0.2 INJECTION, SOLUTION INTRAMUSCULAR; INTRAVENOUS at 15:26:00

## 2024-10-30 RX ADMIN — LIDOCAINE HYDROCHLORIDE 50 MG: 10 INJECTION, SOLUTION EPIDURAL; INFILTRATION; INTRACAUDAL; PERINEURAL at 14:15:00

## 2024-10-30 RX ADMIN — SODIUM CHLORIDE: 9 INJECTION, SOLUTION INTRAVENOUS at 15:44:00

## 2024-10-30 NOTE — PLAN OF CARE
Iv removed, telemetry removed, discharge instructions given with verbal understanding. Patient to be transported by wheelchair by transport.     Received from PACU @ 1800. Pt drinking water, walking the halls. Took oral pain medication, pain controled for home and urinated without any discomfort. Daughter and son at bedside.   Discharge instructions reviewed with family and patient. All verbalize understanding of surgery instructions.           Problem: Patient/Family Goals  Goal: Patient/Family Long Term Goal  Description: Description: Patient's Long Term Goal: \"stay healthy at home\"    Interventions:  -Surgery consult  -take medications as prescribed  -attend follow up appointments as recommended  -diet and activity as advised  -report new or worsening symptoms to physician    Outcome: Adequate for Discharge  Goal: Patient/Family Short Term Goal  Description: Patient's Short Term Goal:  10/28 noc: sleep  10/29 NOC \" no pain\"  Interventions:   - Cluster care  -monitor for complaints of pain  -offer PRN pain meds  Outcome: Adequate for Discharge     Problem: PAIN - ADULT  Goal: Verbalizes/displays adequate comfort level or patient's stated pain goal  Description: INTERVENTIONS:  - Encourage pt to monitor pain and request assistance  - Assess pain using appropriate pain scale  - Administer analgesics based on type and severity of pain and evaluate response  - Implement non-pharmacological measures as appropriate and evaluate response  - Consider cultural and social influences on pain and pain management  - Manage/alleviate anxiety  - Utilize distraction and/or relaxation techniques  - Monitor for opioid side effects  - Notify MD/LIP if interventions unsuccessful or patient reports new pain  - Anticipate increased pain with activity and pre-medicate as appropriate  Outcome: Adequate for Discharge     Problem: GASTROINTESTINAL - ADULT  Goal: Minimal or absence of nausea and vomiting  Description: INTERVENTIONS:  -  Maintain adequate hydration with IV or PO as ordered and tolerated  - Nasogastric tube to low intermittent suction as ordered  - Evaluate effectiveness of ordered antiemetic medications  - Provide nonpharmacologic comfort measures as appropriate  - Advance diet as tolerated, if ordered  - Obtain nutritional consult as needed  - Evaluate fluid balance  Outcome: Adequate for Discharge  Goal: Maintains or returns to baseline bowel function  Description: INTERVENTIONS:  - Assess bowel function  - Maintain adequate hydration with IV or PO as ordered and tolerated  - Evaluate effectiveness of GI medications  - Encourage mobilization and activity  - Obtain nutritional consult as needed  - Establish a toileting routine/schedule  - Consider collaborating with pharmacy to review patient's medication profile  Outcome: Adequate for Discharge     Problem: METABOLIC/FLUID AND ELECTROLYTES - ADULT  Goal: Electrolytes maintained within normal limits  Description: INTERVENTIONS:  - Monitor labs and rhythm and assess patient for signs and symptoms of electrolyte imbalances  - Administer electrolyte replacement as ordered  - Monitor response to electrolyte replacements, including rhythm and repeat lab results as appropriate  - Fluid restriction as ordered  - Instruct patient on fluid and nutrition restrictions as appropriate  Outcome: Adequate for Discharge

## 2024-10-30 NOTE — DISCHARGE INSTRUCTIONS
Home Care Instructions  Cholecystectomy  Dr.Beatrice Mccormack    MEDICATIONS  For post-operative pain control the medications are usually Norco or Tylenol #3.  These are narcotics and are best taken by starting with one tablet and repeating every four to six hours as needed.  If the patient does not feel they need the narcotics they shouldn’t take them.  If the pain is severe the patient may take up to two pills every four hours.  If a minor supplement is necessary in addition to the narcotics do not overlap with Tylenol (any product with acetaminophen) as both Norco and Tylenol #3 contain Tylenol.  Please supplement with Advil (ibuprofen) or Motrin.  Please ask your surgeon before resuming blood thinners such as aspirin, Plavix or Coumadin.  All other home medications may be resumed as scheduled.  With severe cholecystitis, antibiotics will also be prescribed.  With antibiotics, please watch for rash, facial swelling or severe diarrhea.    DIET  The patient may resume a general diet immediately.  This is not a good time to eat excessively.  The patient should eat in moderation and stick with foods the patient feels are easy to digest.  Avoid high fat foods in the immediate post-operative time period as this may still cause some problems.  The patient may eat anything in small amounts but foods rich in dairy products, fatty foods or fried foods may cause an upset stomach for up to six weeks after surgery.  There should be no alcohol consumption in the immediate recovery time period or within six hours of taking narcotics.    WOUND CARE  The top dressing may be removed the day after surgery.  This includes the gauze, tape and band-aids if they are present.  Do not remove the steri-strips or butterfly tapes that are white and adherent to the skin.  The steri-strips will eventually peel up at the ends and at this point they may be removed.  This is usually seven to ten days after surgery.  The patient may shower the day  after surgery.  There is no need to cover the incisions, and all top gauze type dressing should be removed prior to showering.  Soap can get on the wounds but do not scrub over the wounds.  No hair dye or chemicals of any kind should get in the wounds.  Avoid tub baths, swimming or sitting in a hot tub for two weeks.  If visible sutures or staples are present they will be removed in the office by the surgeon or nurse.  Most wounds will be closed with dissolving suture underneath the skin.  These sutures will dissolve on their own.  If a drain is present make sure the patient receives drain care instructions from the nurse prior to discharge.  Most patients will not have a drain.    ACTIVITY  Every day the patient should be up, showered and dressed.  Each day the patient should be up and around the house.  The patient should not lie in bed and should not stay in pajamas.  We count on the patient being up, coughing, walking and deep breathing to avoid pneumonia and blood clots in the legs.  Once a day the patient should get out of the house and go shopping, go to the mall, the [employer] store, the [employer] or a restaurant.  The patient may ride in a car but should not drive the car for at least one week.  Patients should be off narcotics for at least 8 hours prior to being a .  The average time off work is 10 to 14 days; most adults will be seeing the surgeon prior to returning to work.  Students will return to school within 1-5 days after discharge from the hospital but will be off gym, sports, and indoors for recess for one month.  Patients may go up and down stairs and lift up to five pounds but no bending, pushing or pulling.  Nothing called work or exercise until the follow up visit.  No ‘stair-master’, power walking, jogging or workouts until the follow up visit.  Patients should seek further activity limits at the time of their appointment.    APPOINTMENT  Please call our office for an appointment within  five to ten days of discharge.  Any fever greater than 100.5, chills, nausea, vomiting, or severe diarrhea please call our office.  If the wound turns red, hot, swollen, becomes increasingly painful, or drains pus call us immediately at [phone].  For life threatening emergencies call 911.  For non-emergent care please call our office after 8:30 a.m. Monday through Friday.  The number listed above is our office number; our phone automatically switches to our answering service if we are not there.  Please do not use the emergency room for non-urgent care.    Thank you for entrusting us with your care.  [facility]--General Surgery

## 2024-10-30 NOTE — ANESTHESIA PREPROCEDURE EVALUATION
PRE-OP EVALUATION    Patient Name: Valeria Hutchins    Admit Diagnosis: Acute pancreatitis, unspecified complication status, unspecified pancreatitis type (HCC) [K85.90]  Epigastric pain [R10.13]    Pre-op Diagnosis: Cholelithiasis [K80.20]    LAPAROSCOPIC CHOLECYSTECTOMY WITH INTRAOPERATIVE CHOLANGIOGRAMS    Anesthesia Procedure: LAPAROSCOPIC CHOLECYSTECTOMY WITH INTRAOPERATIVE CHOLANGIOGRAMS (Abdomen)    Surgeons and Role:     * Joselin Mccormack MD - Primary    Pre-op vitals reviewed.  Temp: 98.1 °F (36.7 °C)  Pulse: 63  Resp: 18  BP: 148/75  SpO2: 95 %  Body mass index is 39.51 kg/m².    Current medications reviewed.  Hospital Medications:   [COMPLETED] HYDROmorphone (Dilaudid) 1 MG/ML injection 0.5 mg  0.5 mg Intravenous Once    influenza virus trivalent PF (Fluzone Trivalent) 0.5 mL IM injection (ages 6 months to 64 years) 0.5 mL  0.5 mL Intramuscular Prior to discharge    sodium chloride 0.9% infusion   Intravenous Continuous    [Transfer Hold] enoxaparin (Lovenox) 40 MG/0.4ML SUBQ injection 40 mg  40 mg Subcutaneous Daily    [Transfer Hold] acetaminophen (Tylenol Extra Strength) tab 500 mg  500 mg Oral Q4H PRN    [Transfer Hold] HYDROmorphone (Dilaudid) 1 MG/ML injection 0.2 mg  0.2 mg Intravenous Q4H PRN    Or    [Transfer Hold] HYDROmorphone (Dilaudid) 1 MG/ML injection 0.4 mg  0.4 mg Intravenous Q4H PRN    Or    [Transfer Hold] HYDROmorphone (Dilaudid) 1 MG/ML injection 0.8 mg  0.8 mg Intravenous Q4H PRN    [Transfer Hold] melatonin tab 3 mg  3 mg Oral Nightly PRN    [Transfer Hold] polyethylene glycol (PEG 3350) (Miralax) 17 g oral packet 17 g  17 g Oral Daily PRN    [Transfer Hold] sennosides (Senokot) tab 17.2 mg  17.2 mg Oral Nightly PRN    [Transfer Hold] bisacodyl (Dulcolax) 10 MG rectal suppository 10 mg  10 mg Rectal Daily PRN    [Transfer Hold] fleet enema (Fleet) rectal enema 133 mL  1 enema Rectal Once PRN    [Transfer Hold] ondansetron (Zofran) 4 MG/2ML injection 4 mg  4 mg Intravenous Q6H PRN     [Transfer Hold] prochlorperazine (Compazine) 10 MG/2ML injection 5 mg  5 mg Intravenous Q8H PRN    [Transfer Hold] benzonatate (Tessalon) cap 200 mg  200 mg Oral TID PRN    [Transfer Hold] glycerin-hypromellose- (Artificial Tears) 0.2-0.2-1 % ophthalmic solution 1 drop  1 drop Both Eyes QID PRN    [Transfer Hold] sodium chloride (Saline Mist) 0.65 % nasal solution 1 spray  1 spray Each Nare Q3H PRN    [Transfer Hold] cholecalciferol (Vitamin D3) tab 2,000 Units  2,000 Units Oral Daily    [Transfer Hold] multivitamin with minerals (Thera M Plus) tab 1 tablet  1 tablet Oral Daily    [COMPLETED] potassium chloride (Klor-Con M20) tab 40 mEq  40 mEq Oral Once    [COMPLETED] famotidine (Pepcid) 20 mg/2mL injection 20 mg  20 mg Intravenous Once    [COMPLETED] ondansetron (Zofran) 4 MG/2ML injection 4 mg  4 mg Intravenous Once    [COMPLETED] morphINE PF 4 MG/ML injection 4 mg  4 mg Intravenous Once    [COMPLETED] ketorolac (Toradol) 15 MG/ML injection 15 mg  15 mg Intravenous Once    [COMPLETED] sodium chloride 0.9 % IV bolus 1,000 mL  1,000 mL Intravenous Once    [COMPLETED] lactated ringers infusion   Intravenous Once       Outpatient Medications:   Prescriptions Prior to Admission[1]    Allergies: Patient has no known allergies.      Anesthesia Evaluation    Patient summary reviewed.    Anesthetic Complications  (-) history of anesthetic complications         GI/Hepatic/Renal    Negative GI/hepatic/renal ROS.                             Cardiovascular    Negative cardiovascular ROS.                                                   Endo/Other    Negative endo/other ROS.                              Pulmonary    Negative pulmonary ROS.                       Neuro/Psych    Negative neuro/psych ROS.                                  History reviewed. No pertinent surgical history.  Social History     Socioeconomic History    Marital status: Single   Tobacco Use    Smoking status: Never    Smokeless tobacco: Never    Vaping Use    Vaping status: Never Used   Substance and Sexual Activity    Alcohol use: Never    Drug use: Never     History   Drug Use Unknown     Available pre-op labs reviewed.  Lab Results   Component Value Date    WBC 10.3 10/29/2024    RBC 4.08 10/29/2024    HGB 12.2 10/29/2024    HCT 36.5 10/29/2024    MCV 89.5 10/29/2024    MCH 29.9 10/29/2024    MCHC 33.4 10/29/2024    RDW 13.0 10/29/2024    .0 10/29/2024     Lab Results   Component Value Date     10/30/2024    K 3.8 10/30/2024     10/30/2024    CO2 26.0 10/30/2024    BUN 9 10/30/2024    CREATSERUM 0.62 10/30/2024    GLU 93 10/30/2024    CA 9.1 10/30/2024            Airway      Mallampati: I  Mouth opening: >3 FB  TM distance: > 6 cm  Neck ROM: full Cardiovascular    Cardiovascular exam normal.  Rhythm: regular  Rate: normal     Dental    Dentition appears grossly intact         Pulmonary    Pulmonary exam normal.                 Other findings              ASA: 2   Plan: general  NPO status verified and patient meets guidelines.    Post-procedure pain management plan discussed with surgeon and patient.      Plan/risks discussed with: patient                Present on Admission:  **None**             [1]   Medications Prior to Admission   Medication Sig Dispense Refill Last Dose/Taking    Multiple Vitamins-Minerals (MULTI-VITAMIN/MINERALS) Oral Tab Take 1 tablet by mouth daily.   10/28/2024    Cholecalciferol (VITAMIN D) 50 MCG (2000 UT) Oral Cap Take 1 capsule (2,000 Units total) by mouth daily.   10/28/2024 Morning

## 2024-10-30 NOTE — ANESTHESIA PROCEDURE NOTES
Airway  Date/Time: 10/30/2024 2:19 PM  Urgency: elective      General Information and Staff    Patient location during procedure: OR  Anesthesiologist: Molly Aaron MD  Performed: anesthesiologist   Performed by: Molly Aaron MD  Authorized by: Molly Aaron MD      Indications and Patient Condition  Indications for airway management: anesthesia  Sedation level: deep  Preoxygenated: yes  Patient position: sniffing  Mask difficulty assessment: 1 - vent by mask    Final Airway Details  Final airway type: endotracheal airway      Successful airway: ETT  Cuffed: yes   Successful intubation technique: direct laryngoscopy  Facilitating devices/methods: intubating stylet  Endotracheal tube insertion site: oral  Blade: So  Blade size: #3  ETT size (mm): 7.0    Cormack-Lehane Classification: grade IIB - view of arytenoids or posterior of glottis only  Placement verified by: capnometry   Measured from: lips  Number of attempts at approach: 1

## 2024-10-30 NOTE — CONSULTS
, BMI 39.51 [facility]  Report of Consultation    Valeria Hutchins Patient Status:  Inpatient    1970 MRN [id number]   Location [facility] 0SW-A Attending Noé Simms,    Hosp Day # 1 PCP None Pcp     Requesting Physician:  Dr. Munoz     Reason for Consultation:  Pancreatitis, Evaluate for cholecystectomy    Chief Complaint:  Abdominal pain    History of Present Illness:  Valeria Hutchins is a 54 year old female with no significant past medical history who presents to [place] with concerns of epigastric abdominal pain that began 2 days prior to presentation. She reports associated nausea but denies vomiting. She reports a history of postprandial abdominal tightness and discomfort.     Upon presentation to the hospital, she was afebrile and hemodynamically stable. Laboratory workup revealed no leukocytosis, WBC 13.3, hemoglobin 14,6, platelets 296,000. CMP revealed mild hypokalemia (3.4), no other gross electrolyte abnormalities. No acute kidney injury, creatinine 0.68.  Mild elevation in LFTs (Ast 117, ). Total bilirubin 1.1. Triglycerides within normal limits (72). Lipase 8,867.  Abdominal ultrasound reveals cholelithiasis without findings of acute cholecystitis.  Common bile duct measures 5 mm.     Upon general surgery evaluation today, she reports improvement in her abdominal pain, however does report continued ache to RUQ. Repeat lipase this .     She reports no significant past medical history. She reports no previous abdominal surgeries. She denies taking  blood thinning medication.     History:  History reviewed. No pertinent past medical history.  History reviewed. No pertinent surgical history.  History reviewed. No pertinent family history.   reports that she has never smoked. She has never used smokeless tobacco. She reports that she does not drink alcohol and does not use drugs.    Allergies:  Allergies[1]    Medications:  Medications Prior to Admission   Medication Sig     Multiple Vitamins-Minerals (MULTI-VITAMIN/MINERALS) Oral Tab Take 1 tablet by mouth daily.    Cholecalciferol (VITAMIN D) 50 MCG (2000 UT) Oral Cap Take 1 capsule (2,000 Units total) by mouth daily.         Current Facility-Administered Medications:     influenza virus trivalent PF (Fluzone Trivalent) 0.5 mL IM injection (ages 6 months to 64 years) 0.5 mL, 0.5 mL, Intramuscular, Prior to discharge    sodium chloride 0.9% infusion, , Intravenous, Continuous    enoxaparin (Lovenox) 40 MG/0.4ML SUBQ injection 40 mg, 40 mg, Subcutaneous, Daily    acetaminophen (Tylenol Extra Strength) tab 500 mg, 500 mg, Oral, Q4H PRN    HYDROmorphone (Dilaudid) 1 MG/ML injection 0.2 mg, 0.2 mg, Intravenous, Q4H PRN **OR** HYDROmorphone (Dilaudid) 1 MG/ML injection 0.4 mg, 0.4 mg, Intravenous, Q4H PRN **OR** HYDROmorphone (Dilaudid) 1 MG/ML injection 0.8 mg, 0.8 mg, Intravenous, Q4H PRN    melatonin tab 3 mg, 3 mg, Oral, Nightly PRN    polyethylene glycol (PEG 3350) (Miralax) 17 g oral packet 17 g, 17 g, Oral, Daily PRN    sennosides (Senokot) tab 17.2 mg, 17.2 mg, Oral, Nightly PRN    bisacodyl (Dulcolax) 10 MG rectal suppository 10 mg, 10 mg, Rectal, Daily PRN    fleet enema (Fleet) rectal enema 133 mL, 1 enema, Rectal, Once PRN    ondansetron (Zofran) 4 MG/2ML injection 4 mg, 4 mg, Intravenous, Q6H PRN    prochlorperazine (Compazine) 10 MG/2ML injection 5 mg, 5 mg, Intravenous, Q8H PRN    benzonatate (Tessalon) cap 200 mg, 200 mg, Oral, TID PRN    glycerin-hypromellose- (Artificial Tears) 0.2-0.2-1 % ophthalmic solution 1 drop, 1 drop, Both Eyes, QID PRN    sodium chloride (Saline Mist) 0.65 % nasal solution 1 spray, 1 spray, Each Nare, Q3H PRN    cholecalciferol (Vitamin D3) tab 2,000 Units, 2,000 Units, Oral, Daily    multivitamin with minerals (Thera M Plus) tab 1 tablet, 1 tablet, Oral, Daily    Review of Systems:    Allergic/Immuno:  Review of patient's allergies performed.  Cardiovascular:  Negative for cool extremity  and irregular heartbeat/palpitations  Constitutional:  Negative for decreased activity, fever, irritability and lethargy  Endocrine:  Negative for abnormal sleep patterns, increased activity, polydipsia and polyphagia  ENMT:  Negative for ear drainage, hearing loss and nasal drainage  Eyes:  Negative for eye discharge and vision loss  Gastrointestinal:  Positive for abdominal pain, nausea. Negative for constipation, decreased appetite, diarrhea and vomiting  Genitourinary:  Negative for dysuria and hematuria  Hema/Lymph:  Negative for easy bleeding and easy bruising  Integumentary:  Negative for pruritus and rash  Musculoskeletal:  Negative for bone/joint symptoms  Neurological:  Negative for gait disturbance  Psychiatric:  Negative for inappropriate interaction and psychiatric symptoms  Respiratory:  Negative for cough, dyspnea and wheezing     Physical Exam:  /75 (BP Location: Right arm)   Pulse 63   Temp 98.1 °F (36.7 °C) (Oral)   Resp 18   Ht 62\"   Wt 216 lb (98 kg)   SpO2 95%   BMI 39.51 kg/m²     General: Awake, Alert, Cooperative.  No apparent distress.  HEENT: Exam is unremarkable.  Without scleral icterus.  Mucous membranes are moist. Oropharynx is clear.  Neck:   Full range of motion to flexion and extension, lateral rotation and lateral flexion of cervical spine.  No JVD. Supple.   Lungs: No respiratory distress, effort normal  Abdomen:  Soft, non-distended, mildly tender to palpation in right upper quadrant with no rebound or guarding.  No peritoneal signs. No ascites.  Liver is within normal limits.  Spleen is not palpable.    Extremities:  No lower extremity edema noted.  Without clubbing or cyanosis.    Skin: Normal texture and turgor.  Lymphatic:  No palpable cervical lymphadenopathy.  Neurologic: Cranial nerves are grossly intact.  Motor strength and sensory examination is grossly normal.  No focal neurologic deficit.    Laboratory Data:  Recent Labs   Lab 10/28/24  1939 10/29/24  0655    RBC 4.77 4.08   HGB 14.6 12.2   HCT 41.0 36.5   MCV 86.0 89.5   MCH 30.6 29.9   MCHC 35.6 33.4   RDW 13.0 13.0   NEPRELIM 11.81* 8.25*   WBC 13.3* 10.3   .0 240.0       Recent Labs   Lab 10/28/24  1939 10/29/24  0618 10/30/24  0431   * 120*  --    BUN 22 21  --    CREATSERUM 0.82 0.68  --    CA 10.0 9.1  --    ALB 3.8 3.7  --    * 139  --    K 3.4* 3.4* 3.6    110  --    CO2 24.0 24.0  --    ALKPHO 112 92  --    * 113*  --    * 122*  --    BILT 1.1 1.2  --    TP 8.1 6.6  --          No results for input(s): \"PTP\", \"INR\", \"PTT\" in the last 168 hours.      No results found.      Terrie Regalado PA-C  10/30/2024  10:44 AM      Medical Decision Making         Impression:  Patient Active Problem List   Diagnosis    Epigastric pain    Acute pancreatitis, unspecified complication status, unspecified pancreatitis type (HCC)       54-year-old female who presents to the emergency department with complaints of epigastric and right upper quadrant abdominal pain.  Radha reports that over the past several months she has had intermittent postprandial symptoms of upper abdominal discomfort, mild nausea and abdominal distention.  She states that as the symptoms were mild and transient she did not seek evaluation.  Radha reports that yesterday she developed severe right upper quadrant abdominal pain radiating to the epigastrium.  As the pain was significant and unremitting, she did seek evaluation in [place] ED.  Past medical-surgical history-none , BMI 39.51  Workup in the ED revealed mild leukocytosis of 13.3, hemoglobin and platelet count within normal limits.  CMP revealed hypokalemia 3.4, elevation of LFTs with , .  Total bilirubin 1.1.  Triglycerides 77.  Lipase significantly elevated at 8867.  Abdominal ultrasound revealed cholelithiasis, CBD measuring 5 mm.  No evidence of choledocholithiasis.  Repeat serology this morning revealed AST and ALT decreased to 113 and  122.  Total bilirubin 1.2.  Lipase today is 107.  On physical examination the patient does have some residual tenderness in the right upper quadrant.  There is no evidence of guarding, rebound or percussion tenderness.  No hernias are noted.  Treatment options were reviewed in detail with the patient.  It appears that she did have gallstone pancreatitis on admission however now her abdominal pain has significantly improved and lipase is 107.  Constellation of symptoms is likely due to past CBD stone.  Treatment options were reviewed in detail with Radha.  At this time she does wish to proceed with laparoscopic cholecystectomy with intraoperative cholangiogram for symptomatic cholelithiasis due to ongoing symptoms of abdominal pain and tenderness.    The risks, benefits, complications, possible outcomes and alternatives of the procedure were explained to the patient in detail.  Potential complications of this procedure and as with any surgical procedure and anesthesia were reviewed including but not limited to bleeding, infection, thromboembolic event, pneumonia were discussed.  Expected postoperative pain, recuperation and postoperative course and possible complications were also reviewed.  All questions from the patient were answered in detail.  The patient did verbalize understanding and does not have any further questions at this time.  The patient does wish to proceed with the proposed procedure.    ANKUSH Mccormack MD, FACS    Please note that this report has been produced using speech recognition software and may contain errors related to that system including but not limited to errors in grammar, punctuation and spelling as well as words and phrases that possibly may have been recognized inappropriately.  If there are any questions or concerns please contact the dictating provider for clarification.  The 21st Century Cures Act makes medical notes like these available to patients in the interest of trans parency.  Please be advised this is a medical document. Medical documents are intended to carry relevant information, facts as evident, and the clinical opinion of the practitioner. The medical note is intended as peer to peer communication and may appear blunt or direct. It is written in medical language and may contain abbreviations or verbiage that are unfamiliar.  If there are any questions or concerns please contact the dictating provider for clarification.        Is this a shared or split note between Advanced Practice Provider and Physician? Yes                              [1] No Known Allergies

## 2024-10-30 NOTE — PROGRESS NOTES
Call to , verified that respiratory pathogen panel with be run from covid/flu/RSV [employer] states she will have it sent out.   [facility]   part of [facility]     Hospitalist Progress Note     Valeria Hutchins Patient Status:  Inpatient    1970 MRN [id number]   Location [facility] PRE OP HOLDING Attending Noé Simms DO   Hosp Day # 1 PCP None Pcp     Chief Complaint: Abdominal pain    Subjective:     Patient seen and examined. Mild upper abdominal pain. Denies N/V.     Objective:    Review of Systems:   A comprehensive review of systems was completed; pertinent positive and negatives stated in subjective.    Vital signs:  Temp:  [96.9 °F (36.1 °C)-98.7 °F (37.1 °C)] 98.1 °F (36.7 °C)  Pulse:  [59-84] 63  Resp:  [16-20] 18  BP: (142-153)/(68-87) 148/75  SpO2:  [95 %-99 %] 95 %    Physical Exam:    General: No acute distress  Respiratory: No wheezes, no rhonchi  Cardiovascular: S1, S2, regular rate and rhythm  Abdomen: Soft, Non-tender, non-distended, positive bowel sounds  Neuro: No new focal deficits.   Extremities: No edema    Diagnostic Data:    Labs:  Recent Labs   Lab 10/28/24  1939 10/29/24  0618   WBC 13.3* 10.3   HGB 14.6 12.2   MCV 86.0 89.5   .0 240.0       Recent Labs   Lab 10/28/24  1939 10/29/24  0618 10/30/24  0431 10/30/24  1052   * 120*  --  93   BUN 22 21  --  9   CREATSERUM 0.82 0.68  --  0.62   CA 10.0 9.1  --  9.1   ALB 3.8 3.7  --  3.9   * 139  --  139   K 3.4* 3.4* 3.6 3.8    110  --  107   CO2 24.0 24.0  --  26.0   ALKPHO 112 92  --  97   * 113*  --  40*   * 122*  --  86*   BILT 1.1 1.2  --  0.9   TP 8.1 6.6  --  6.8       Estimated Creatinine Clearance: 80.1 mL/min (based on SCr of 0.62 mg/dL).    Recent Labs   Lab 10/28/24  1939   TROPHS 13       No results for input(s): \"PTP\", \"INR\" in the last 168 hours.               Microbiology    No results found for this visit on 10/28/24.      Imaging: Reviewed in Epic.    Medications:    [Transfer Hold] enoxaparin  40 mg Subcutaneous Daily    [Transfer Hold] cholecalciferol  2,000 Units Oral Daily    [Transfer  Hold] multivitamin with minerals  1 tablet Oral Daily       Assessment & Plan:      #Presumed gallstone pancreatitis   #Cholelithiasis  #Transaminitis  -Plan for lap alon today per general surgery  -LFTs improving  -Continue IVF, anti-emetics, pain control     Noé Simms DO    Supplementary Documentation:     Quality:  DVT Mechanical Prophylaxis:   SCDs,    DVT Pharmacologic Prophylaxis   Medication    [Transfer Hold] enoxaparin (Lovenox) 40 MG/0.4ML SUBQ injection 40 mg                Code Status: Not on file  Valdes: No urinary catheter in place  Valdes Duration (in days):   Central line:    JUAN A:     Discharge is dependent on: clinical progress  At this point Ms. Hutchins is expected to be discharge to: home    The 21st Century Cures Act makes medical notes like these available to patients in the interest of transparency. Please be advised this is a medical document. Medical documents are intended to carry relevant information, facts as evident, and the clinical opinion of the practitioner. The medical note is intended as peer to peer communication and may appear blunt or direct. It is written in medical language and may contain abbreviations or verbiage that are unfamiliar.

## 2024-10-30 NOTE — PROGRESS NOTES
For consultation received for evaluation management of pancreatitis.    Data reviewed.  Imaging noted.  No acute surgical intervention required tonight.    Full consult to follow tomorrow by Dr. Mccormack, acute care surgery.        Hugo Ortega MD

## 2024-10-30 NOTE — ANESTHESIA POSTPROCEDURE EVALUATION
[facility]    [facility]   Age/Gender 54 year old female MRN [id number]   Location [facility] POST ANESTHESIA CARE UNIT Attending Noé Simms DO   Hosp Day # 1 PCP None Pcp       Anesthesia Post-op Note    LAPAROSCOPIC CHOLECYSTECTOMY WITH INTRAOPERATIVE CHOLANGIOGRAM    Procedure Summary       Date: 10/30/24 Room / Location:  MAIN OR 10 / [facility] MAIN OR    Anesthesia Start: 1413 Anesthesia Stop: 1544    Procedure: LAPAROSCOPIC CHOLECYSTECTOMY WITH INTRAOPERATIVE CHOLANGIOGRAM (Abdomen) Diagnosis:       Cholelithiasis      (Cholelithiasis [K80.20], cholecystitis)    Surgeons: Joselin Mccormack MD Anesthesiologist: Molly Aaron MD    Anesthesia Type: general ASA Status: 2            Anesthesia Type: general    Vitals Value Taken Time   /85 10/30/24 1545   Temp 99.8 °F (37.7 °C) 10/30/24 1540   Pulse 81 10/30/24 1547   Resp 18 10/30/24 1547   SpO2 98 % 10/30/24 1547   Vitals shown include unfiled device data.    Patient Location: PACU    Anesthesia Type: general    Airway Patency: patent and extubated    Postop Pain Control: adequate    Mental Status: mildly sedated but able to meaningfully participate in the post-anesthesia evaluation    Nausea/Vomiting: none    Cardiopulmonary/Hydration status: stable euvolemic    Complications: no apparent anesthesia related complications    Postop vital signs: stable    Dental Exam: Unchanged from Preop    Patient to be discharged home when criteria met.

## 2024-10-30 NOTE — CM/SW NOTE
CM spoke to patient for PHQ-4 admission screening follow up; CM asked patient about increased feelings of anxiety or depression over past 1-2 weeks, patient stated \" I have had a lot going on recently, but I am okay\".  Patient stated she has a Therapist available \"as needed\", has resources available through her employer, and a supportive family.  Patient denied thoughts of self harm.  CM offered additional resources, patient politely declined at this time and thanked CM.      Myra Pack RN Case Manager [phone]    Yes ok to refer. Referral being placed at the request of Neuro/Neuro Surg.

## 2024-10-30 NOTE — PLAN OF CARE
Patient is  alert, interactive and cooperative with care. She denies chest pain and shortness of breath. No signs of distress noted.Surgery consulted. No new orders.  Due meds given. PRN pain meds and anti emetics available. IVF infusing. Follow up labs ordered for tomorrow. Falls and safety precautions maintained.     Problem: GASTROINTESTINAL - ADULT  Goal: Minimal or absence of nausea and vomiting  Description: INTERVENTIONS:  - Maintain adequate hydration with IV or PO as ordered and tolerated  - Nasogastric tube to low intermittent suction as ordered  - Evaluate effectiveness of ordered antiemetic medications  - Provide nonpharmacologic comfort measures as appropriate  - Advance diet as tolerated, if ordered  - Obtain nutritional consult as needed  - Evaluate fluid balance  Outcome: Progressing  Goal: Maintains or returns to baseline bowel function  Description: INTERVENTIONS:  - Assess bowel function  - Maintain adequate hydration with IV or PO as ordered and tolerated  - Evaluate effectiveness of GI medications  - Encourage mobilization and activity  - Obtain nutritional consult as needed  - Establish a toileting routine/schedule  - Consider collaborating with pharmacy to review patient's medication profile  Outcome: Progressing     Problem: PAIN - ADULT  Goal: Verbalizes/displays adequate comfort level or patient's stated pain goal  Description: INTERVENTIONS:  - Encourage pt to monitor pain and request assistance  - Assess pain using appropriate pain scale  - Administer analgesics based on type and severity of pain and evaluate response  - Implement non-pharmacological measures as appropriate and evaluate response  - Consider cultural and social influences on pain and pain management  - Manage/alleviate anxiety  - Utilize distraction and/or relaxation techniques  - Monitor for opioid side effects  - Notify MD/LIP if interventions unsuccessful or patient reports new pain  - Anticipate increased pain with  activity and pre-medicate as appropriate  Outcome: Progressing     Problem: METABOLIC/FLUID AND ELECTROLYTES - ADULT  Goal: Electrolytes maintained within normal limits  Description: INTERVENTIONS:  - Monitor labs and rhythm and assess patient for signs and symptoms of electrolyte imbalances  - Administer electrolyte replacement as ordered  - Monitor response to electrolyte replacements, including rhythm and repeat lab results as appropriate  - Fluid restriction as ordered  - Instruct patient on fluid and nutrition restrictions as appropriate  Outcome: Progressing     Problem: Patient/Family Goals  Goal: Patient/Family Long Term Goal  Description: Description: Patient's Long Term Goal: \"stay healthy at home\"    Interventions:  -Surgery consult  -take medications as prescribed  -attend follow up appointments as recommended  -diet and activity as advised  -report new or worsening symptoms to physician    Outcome: Progressing  Goal: Patient/Family Short Term Goal  Description: Patient's Short Term Goal:  10/28 noc: sleep  10/29 NOC \" no pain\"  Interventions:   - Cluster care  -monitor for complaints of pain  -offer PRN pain meds  Outcome: Progressing

## 2024-10-30 NOTE — OPERATIVE REPORT
[facility]   Operative Note    Valeria Hutchins Location: OR   CSN [id number] MRN [id number]   Admission Date 10/28/2024 Operation Date 10/30/2024   Attending Physician Noé Simms DO Operating Physician Joselin Mccormack MD     Date of procedure:   10-    Pre-Operative Diagnosis: Cholelithiasis [K80.20], cholecystitis, resolved gallstone pancreatitis    Indication for Surgery: Biliary colic, symptomatic cholelithiasis    Post-Operative Diagnosis/Operative Findings: Same as above-negative intraoperative cholangiogram    Procedure performed:  Laparoscopic cholecystectomy with fluoroscopic intraoperative cholangiogram    Surgeons and Role:     * Joselin Mccormack MD - Primary    Assist:     LEATHA Johnosn    Anesthesia: General    History of Present Illness:    54-year-old female who presents to the emergency department with complaints of epigastric and right upper quadrant abdominal pain.  Radha reports that over the past several months she has had intermittent postprandial symptoms of upper abdominal discomfort, mild nausea and abdominal distention.  She states that as the symptoms were mild and transient she did not seek evaluation.  Radha reports that yesterday she developed severe right upper quadrant abdominal pain radiating to the epigastrium.  As the pain was significant and unremitting, she did seek evaluation in [place] ED.  Past medical-surgical history-none , BMI 39.51  Workup in the ED revealed mild leukocytosis of 13.3, hemoglobin and platelet count within normal limits.  CMP revealed hypokalemia 3.4, elevation of LFTs with , .  Total bilirubin 1.1.  Triglycerides 77.  Lipase significantly elevated at 8867.  Abdominal ultrasound revealed cholelithiasis, CBD measuring 5 mm.  No evidence of choledocholithiasis.  Repeat serology this morning revealed AST and ALT decreased to 113 and 122.  Total bilirubin 1.2.  Lipase today is 107.  On physical examination the patient does have  some residual tenderness in the right upper quadrant.  There is no evidence of guarding, rebound or percussion tenderness.  No hernias are noted.  Treatment options were reviewed in detail with the patient.  It appears that she did have gallstone pancreatitis on admission however now her abdominal pain has significantly improved and lipase is 107.  Constellation of symptoms is likely due to past CBD stone.  Treatment options were reviewed in detail with Radha.  At this time she does wish to proceed with laparoscopic cholecystectomy with intraoperative cholangiogram for symptomatic cholelithiasis due to ongoing symptoms of abdominal pain and tenderness.    Discussed with patient:  The potential risks and benefits of the procedure were discussed in detail with the patient including but not limited to bleeding, infections, seroma/hematoma formation, postoperative wound complications including development of a hernia, trocar injury, intra-abdominal organ injury, bile leakage, biloma formation, common bile duct injury, conversion to an open procedure, inability to complete the cholangiogram, possible need for a drain, possible recurrence or persistence of symptoms despite surgery, postoperative diarrhea, possible need for further treatment or intervention pending cholangiogram results, pneumonia, postoperative thromboembolic event including DVT and PE.  These and other potential intraoperative and perioperative risks were discussed and the patient does not have any questions and does wish to proceed with surgery today.    Note:  A surgical assistant was essential to the performance and conduct of this case, especially in the performance of the cholangiogram and the careful dissection needed in and around the triangle of Calot and gallbladder hilum.    Summary of Procedure:   The patient was taken to the operating room and was placed on the OR table in the supine position. After the induction of general anesthesia  perioperative antibiotics were given. The patient was then prepped and draped in usual sterile fashion. Using local anesthesia a senait-umbilical incision was made and the anterior abdominal fascia was elevated with a Kocker forcep. The Veress needle was introduced into the abdomen and the abdomen was insufflated to 15 mm mercury. The Veress needle was then exchanged for a 11 mm port. The laparoscope was introduced.  A 5 mm epigastric port and two 5 mm lateral ports were placed under direct vision without incidence.  The patient was placed into a reverse Trendelenburg position with the right side up.      Initial evaluation of the right upper quadrant revealed moderate edema of the gallbladder wall consistent with acute cholecystitis.  The gallbladder was grasped at the fundus and elevated superiorly.   Cade's pouch was identified and this was retracted laterally to expose the triangle of Calot.  Dissection in the cystic duct-gallbladder junction was performed to define the cystic duct for sufficient length.  Dissection was kept above the line of Rouviere and a critical view was obtained.  The cystic duct was then clipped once proximally on the specimen side.  A ductotomy was made.  A cholangiocatheter was introduced through the lateral 5 mm port.   The cholangiocatheter was introduced into the cystic duct.  An intraoperative cholangiogram was then performed. There was no evidence of a filling defect in the cystic or the common bile duct. The common bile duct tapers down smoothly to the duodenum and there was free flow of contrast into the duodenum. The proximal common hepatic duct and distal intrahepatic radicals were visualized  without any evidence of a filling defects or other abnormalities. Representative images were submitted to the Radiology Department for a final read.  The cholangiocatheter was then removed.    The cystic duct was then clipped  3 times distally. The cystic duct was then divided. The cystic  artery was identified and seen  to ascend onto the gallbladder wall.  This was also defined for a sufficient length and was clipped twice proximally and once distally and divided. Electrocautery was then used to dissect the gallbladder away from the liver bed. Having completed this maneuver the gallbladder was placed into a Endopouch and was withdrawn through the umbilical port. The right upper quadrant was copiously irrigated until the irrigant was clear. The clips across the cystic duct and the cystic artery were examined and found to be well approximated and in good position. There was no evidence of bleeding or bile leakage from the liver bed.  The accessory ports were removed under direct vision and there was no evidence of bleeding from the anterior abdominal wall. The abdomen was then deflated. The umbilical port was closed with 0 Vicryl.  All skin incisions were closed with 4-0 Vicryl in a subcuticular manner.    Steri-Strips and sterile dressings were placed.  All sponge, instrument and needle counts were correct at the conclusion of the procedure. The patient did tolerate the procedure well.  The patient was taken to the recovery room in stable condition.    EBL:   Minimal     Pathologic specimens: The gallbladder and its contents    ANKUSH Mccormack MD, FACS      Please note that this report has been produced using speech recognition software and may contain errors related to that system including but not limited to errors in grammar, punctuation and spelling as well as words and phrases that possibly may have been recognized inappropriately.  If there are any questions or concerns please contact the dictating provider for clarification.  The 21st Century Cures Act makes medical notes like these available to patients in the interest of trans parency. Please be advised this is a medical document. Medical documents are intended to carry relevant information, facts as evident, and the clinical opinion of the  practitioner. The medical note is intended as peer to peer communication and may appear blunt or direct. It is written in medical language and may contain abbreviations or verbiage that are unfamiliar.  If there are any questions or concerns please contact the dictating provider for clarification.

## 2024-10-31 ENCOUNTER — PATIENT OUTREACH (OUTPATIENT)
Dept: CASE MANAGEMENT | Age: 54
End: 2024-10-31

## 2024-10-31 PROBLEM — R79.89 ELEVATED LFTS: Status: ACTIVE | Noted: 2024-10-31

## 2024-10-31 PROBLEM — Z90.49 S/P LAPAROSCOPIC CHOLECYSTECTOMY: Status: ACTIVE | Noted: 2024-10-31

## 2024-10-31 PROBLEM — R10.9 ACUTE ABDOMINAL PAIN: Status: ACTIVE | Noted: 2024-10-31

## 2024-10-31 PROBLEM — E87.6 HYPOKALEMIA: Status: ACTIVE | Noted: 2024-10-31

## 2024-10-31 NOTE — PROGRESS NOTES
Called pt to schedule hospital follow-up appts :    TCC appointment request (discharged 10/30)     [facility]  [address]  [place] [address]  [phone]  Friday 11/1 @7am w/ Brianda ZHANG  General Surgery  [facility]  [address] [place] [address]  [phone]  Follow up 2 weeks  Wednesday 11/13 @8:45am w/ Gen Bev PA      Confirmed scheduled appts & pt verbalized that she understands.  No further assistance needed.      Closing encounter

## 2024-10-31 NOTE — PROGRESS NOTES
Voicemail received; Patient requesting assistance with scheduling appointment. Patient can be reached at [phone].    TCC appointment request (discharged 10/30)    [facility]  [address] [place] [address] [phone]    Dr Joselin Mccormack  General Surgery  [facility]  [address] [place] [address] [phone]  Follow up 2 weeks

## 2024-10-31 NOTE — PAYOR COMM NOTE
--------------  DISCHARGE REVIEW    Payor: [employer]  Subscriber #:  [id number]  Authorization Number: [id number]    Admit date: 10/29/24  Admit time:   1:08 AM  Discharge Date: 10/30/2024  7:23 PM          Discharge Summary Notes    No notes of this type exist for this encounter.         HOME

## 2024-11-01 ENCOUNTER — OFFICE VISIT (OUTPATIENT)
Dept: INTERNAL MEDICINE CLINIC | Facility: CLINIC | Age: 54
End: 2024-11-01
Payer: COMMERCIAL

## 2024-11-01 ENCOUNTER — PATIENT OUTREACH (OUTPATIENT)
Dept: CASE MANAGEMENT | Age: 54
End: 2024-11-01

## 2024-11-01 VITALS
BODY MASS INDEX: 41.31 KG/M2 | TEMPERATURE: 98 F | WEIGHT: 224.5 LBS | HEIGHT: 62 IN | OXYGEN SATURATION: 96 % | DIASTOLIC BLOOD PRESSURE: 68 MMHG | RESPIRATION RATE: 15 BRPM | SYSTOLIC BLOOD PRESSURE: 120 MMHG | HEART RATE: 87 BPM

## 2024-11-01 DIAGNOSIS — Z02.9 ENCOUNTERS FOR UNSPECIFIED ADMINISTRATIVE PURPOSE: Primary | ICD-10-CM

## 2024-11-01 DIAGNOSIS — K80.00 CALCULUS OF GALLBLADDER WITH ACUTE CHOLECYSTITIS WITHOUT OBSTRUCTION: ICD-10-CM

## 2024-11-01 DIAGNOSIS — R10.9 ACUTE ABDOMINAL PAIN: Primary | ICD-10-CM

## 2024-11-01 DIAGNOSIS — K59.03 DRUG-INDUCED CONSTIPATION: ICD-10-CM

## 2024-11-01 DIAGNOSIS — E87.6 HYPOKALEMIA: ICD-10-CM

## 2024-11-01 DIAGNOSIS — K85.10 GALLSTONE PANCREATITIS (HCC): ICD-10-CM

## 2024-11-01 DIAGNOSIS — R79.89 ELEVATED LFTS: ICD-10-CM

## 2024-11-01 DIAGNOSIS — Z90.49 S/P LAPAROSCOPIC CHOLECYSTECTOMY: ICD-10-CM

## 2024-11-01 PROCEDURE — 99495 TRANSJ CARE MGMT MOD F2F 14D: CPT | Performed by: NURSE PRACTITIONER

## 2024-11-01 PROCEDURE — 3074F SYST BP LT 130 MM HG: CPT | Performed by: NURSE PRACTITIONER

## 2024-11-01 PROCEDURE — 3078F DIAST BP <80 MM HG: CPT | Performed by: NURSE PRACTITIONER

## 2024-11-01 PROCEDURE — 3008F BODY MASS INDEX DOCD: CPT | Performed by: NURSE PRACTITIONER

## 2024-11-01 NOTE — DISCHARGE SUMMARY
[facility]     Valeria Hutchins Patient Status:  Inpatient    1970 MRN [id number]   Location [facility] 0SW-A Attending No att. providers found   Hosp Day # 1 PCP None Pcp     Date of Admission: 10/28/2024  Date of Discharge:  10/30/2024    Discharge Disposition: Home or Self Care    Discharge Diagnosis:  Presumed gallstone pancreatitis  Cholelithiasis  Transaminitis    History of Present Illness: Valeria Hutchins is a 54 year old female with no PMHx who presented to the hospital for abdominal pain . Her pain began 2 days ago and was a sharp knifing pain in her epigastric region that radiated to her back. It was rated 4-10/10 with no specific alleviating or exacerbating factors. She had associated nausea without emesis and was not able to eat or drink much as a result. She denied any fever, chills, diarrhea, constipation. She denied prior issues with her gallbladder.     Brief Synopsis:     #Presumed gallstone pancreatitis   #Cholelithiasis  #Transaminitis  -S/p lap alon 10/30   -LFTs improving  -Continue anti-emetics, pain control   -General surgery following    #Disposition  -Stable for DC home    Lace+ Score: 25  59-90 High Risk  29-58 Medium Risk  0-28   Low Risk  Patient was referred to the [facility].    TCM Follow-Up Recommendation:  LACE < 29: Low Risk of readmission after discharge from the hospital; Still recommend for TCM follow-up.      Procedures during hospitalization:   Laparoscopic cholecystectomy    Incidental or significant findings and recommendations (brief descriptions):  None    Lab/Test results pending at Discharge:   None    Consultants:  General surgery    Discharge Medication List:     Discharge Medications        START taking these medications        Instructions Prescription details   HYDROcodone-acetaminophen 5-325 MG Tabs  Commonly known as: Norco  Notes to patient: Took oxycodone at 1831.      Take 1 tablet by mouth every 6 (six)  hours as needed.   Quantity: 15 tablet  Refills: 0            CONTINUE taking these medications        Instructions Prescription details   Multi-Vitamin/Minerals Tabs  Notes to patient: Tomorrow morning       Take 1 tablet by mouth daily.   Refills: 0     Vitamin D 50 MCG (2000) Caps  Notes to patient: Tomorrow morning      Take 1 capsule (2,000 Units total) by mouth daily.   Refills: 0               Where to Get Your Medications        These medications were sent to [id number] [place] - [address] AT [facility], [phone], [phone]  [address] [place] [place] [address]      Hours: 24-hours Phone: [phone]   HYDROcodone-acetaminophen 5-325 MG Tabs         [place]    Follow-up appointment:   [facility]   [facility] [id number] [phone]    Schedule an appointment as soon as possible for a visit in 2 week(s)      Joselin Mccormack MD   [facility] [id number] [phone]    Follow up  As needed    Appointments for Next 30 Days 2024 - 2024        Date Arrival Time Visit Type Length Department Provider     2024  8:45 AM  POST OP VISIT [7268] 15 min [facility]    Patient Instructions:         Location Instructions:     Masks are optional for all patients and visitors, unless otherwise indicated.                  -----------------------------------------------------------------------------------------------  PATIENT DISCHARGE INSTRUCTIONS: See electronic chart    Noé Simms DO    Total time spent on discharge plannin minutes     The  Cures Act makes medical notes like these available to patients in the interest of transparency. Please be advised this is a medical document. Medical documents are intended to carry relevant information, facts as evident, and the clinical opinion of the practitioner. The medical note is  intended as peer to peer communication and may appear blunt or direct. It is written in medical language and may contain abbreviations or verbiage that are unfamiliar.

## 2024-11-01 NOTE — PROGRESS NOTES
[facility] PHARMACIST MEDICATION RECONCILIATION        Valeria Hutchins MRN [id number]    1970 PCP None Pcp       Comments      The following medication changes occurred while patient was hospitalized:  Medications Started:   Hydrocodone-APAP 5-325 mg Q6H PRN        Outpatient Encounter Medications as of 2024   Medication Sig    HYDROcodone-acetaminophen 5-325 MG Oral Tab Take 1 tablet by mouth every 6 (six) hours as needed.    Multiple Vitamins-Minerals (MULTI-VITAMIN/MINERALS) Oral Tab Take 1 tablet by mouth daily.    Cholecalciferol (VITAMIN D) 50 MCG (2000 UT) Oral Cap Take 1 capsule (2,000 Units total) by mouth daily.     No facility-administered encounter medications on file as of 2024.             Medication Adherence Assessment:   Patient reports that she her pain level has been relatively low. She states that she been taking her hydrocodone-APAP every 6-8 hours since discharge. Patient instructed on alternative pain control to minimize use of opioids. Patient has not had bowel movement. Provided recommendation to take docusate-senna or polyethylene glycol for constipation.     Reviewed all medications in detail with patient including dose, indication, timing of administration, monitoring parameters, and potential side effects of medications.   Patient confirmed understanding.     Thank you,    Daly Cotton, PharmD, 2024, 7:50 AM  [facility]

## 2024-11-01 NOTE — PROGRESS NOTES
TCM VISIT  [facility]      HISTORY   Chief complaint: Hospital follow-up    HPI: Valeria Hutchins is a 54 year old female here today for hospital follow up for acute abdominal pain, gallstone pancreatitis, calculus of gallbladder with acute cholecystitis without obstruction, s/p laparoscopic cholecystectomy, hypokalemia, elevated LFTs, constipation.  Valeria Hutchins was discharged from [facility]  to Home or Self Care.  Admit Date: 10/28/24. Discharge Date: 10/30/24.     Hospital Discharge Diagnosis:      Acute abdominal pain  Gallstone pancreatitis  Calculus of gallbladder with acute cholecystitis without obstruction  S/p laparoscopic cholecystectomy  Hypokalemia  Elevated LFTs    Hospital stay was uncomplicated.  Valeria Hutchins was discharge with Norco and a referral to the [facility] for continued follow up.      Today, patient is being seen for hospital follow-up.  Patient reports getting better since discharge.  She is accompanied by family at time of exam.    She presented to ED with 2 days of sharp knife being pain in her epigastric region that radiated to her back.  Pain was rated 4-10/10 with specific alleviating or exacerbating factors.  She had associated nausea without emesis and was not able to eat or drink much as a result.  She was admitted and seen by general surgery.  Found to have acute gallstone pancreatitis with lipase on admission of 8867.  She had ultrasound which revealed cholelithiasis without findings of acute cholecystitis.  Common bile duct measures 5 mm.  Repeat lipase improved to 107.  Per general surgery patient still had residual tenderness in the RUQ and treatment options were reviewed in detail.  Discussed she had gallstone pancreatitis on admission however now her pain has significantly improved and is likely due to to have passed CBD stone.  Patient elected to undergo laparoscopic cholecystectomy with IOC for symptomatic cholelithiasis.  Her procedure was  without complications and she was tolerating a diet, up ambulating, and pain was controlled with oral pain medication.  She was cleared for discharge and discharged home in stable condition.    Interactive contact within 2 business days post discharge first initiated on Date: 11/1/2024      Allergies:  Allergies[1]   Current Meds:  Current Outpatient Medications   Medication Sig Dispense Refill    HYDROcodone-acetaminophen 5-325 MG Oral Tab Take 1 tablet by mouth every 6 (six) hours as needed. 15 tablet 0    Multiple Vitamins-Minerals (MULTI-VITAMIN/MINERALS) Oral Tab Take 1 tablet by mouth daily.      Cholecalciferol (VITAMIN D) 50 MCG (2000 UT) Oral Cap Take 1 capsule (2,000 Units total) by mouth daily.       No current facility-administered medications for this visit.       HISTORY: reconciled and reviewed with patient  No past medical history on file.   No past surgical history on file.   No family history on file.   Social History     Socioeconomic History    Marital status: Single   Tobacco Use    Smoking status: Never    Smokeless tobacco: Never   Vaping Use    Vaping status: Never Used   Substance and Sexual Activity    Alcohol use: Never    Drug use: Never     Social Drivers of Health     Food Insecurity: No Food Insecurity (10/29/2024)    Food Insecurity     Food Insecurity: Never true   Transportation Needs: No Transportation Needs (10/29/2024)    Transportation Needs     Lack of Transportation: No   Housing Stability: Low Risk  (10/29/2024)    Housing Stability     Housing Instability: No        Imaging & Consults:  XR OPER CHOLANGIOGRAM (CPT=74300)    Result Date: 10/30/2024  CONCLUSION:  There is no evidence of retained bile duct stone.   LOCATION:  [facility]    Dictated by (CST): Albert Orantes MD on 10/30/2024 at 3:17 PM     Finalized by (CST): Albert Orantes MD on 10/30/2024 at 3:17 PM       US ABDOMEN COMPLETE (CPT=76700)    Result Date: 10/28/2024  CONCLUSION:  Cholelithiasis without secondary  sonographic findings of acute cholecystitis, correlate clinically.   LOCATION:  [facility]    Dictated by (CST): Hemal Alba MD on 10/28/2024 at 10:05 PM     Finalized by (CST): Hemal Alba MD on 10/28/2024 at 10:05 PM        Lab Results   Component Value Date/Time    WBC 10.3 10/29/2024 06:18 AM    HGB 12.2 10/29/2024 06:18 AM    HCT 36.5 10/29/2024 06:18 AM    .0 10/29/2024 06:18 AM    GLU 93 10/30/2024 10:52 AM     10/30/2024 10:52 AM    K 3.8 10/30/2024 10:52 AM     10/30/2024 10:52 AM    CO2 26.0 10/30/2024 10:52 AM    BUN 9 10/30/2024 10:52 AM    CREATSERUM 0.62 10/30/2024 10:52 AM    CA 9.1 10/30/2024 10:52 AM    MG 2.0 10/29/2024 02:37 AM    ALB 3.9 10/30/2024 10:52 AM    ALT 86 (H) 10/30/2024 10:52 AM    AST 40 (H) 10/30/2024 10:52 AM       There is no immunization history for the selected administration types on file for this patient.    ROS:  GENERAL: energy fair/stable, denies fever, weakness  SKIN: denies any skin changes, + lap sites x 4 with Steri-Strips D/I and DAQUAN with bruising present  EYES: denies blurred vision, eye pain  HEENT: denies ear pain, loss of hearing, sore throat  RESPIRATORY: denies cough, denies dyspnea with exertion  CARDIOVASCULAR: denies syncope, chest pain, fatigue, palpitations   GI: + Incisional abdominal pain, denies melena, + constipation, denies diarrhea, + occasional nausea, denies vomiting   MUSCULOSKELETAL: denies pain, states normal range of motion of extremities  NEUROLOGIC: denies confusion, balance difficulty  PSYCHIATRIC: denies depression or anxiety  HEMATOLOGIC: denies history of anemia, + bruising, denies bleeding    PHYSICAL EXAM:  Vitals:    11/01/24 0741   BP: 120/68   Pulse: 87   Resp: 15   Temp: 98.4 °F (36.9 °C)       GENERAL: well developed, well nourished, in no apparent distress, good historian  INTEGUMENTARY: warm, dry, no rashes, + lap sites x 4 with Steri-Strips D/I and DAQUAN with bruising present  HEENT: atraumatic, normocephalic,  sclera white, conjunctivae pink  NECK: supple  CHEST: no chest tenderness  LUNGS: clear to auscultation bilaterally, no wheezes, rhonchi or rales, normal respiratory effort  CARDIO: S1, S2, RRR without audible murmur  GI: + BS to all quadrants, no tenderness  MUSCULOSKELETAL: + ROM in all joints, no evidence of swelling, pain   EXTREMITIES: no cyanosis, or edema  NEURO: Oriented x3  PSYCHIATRIC: appropriate affect    ASSESSMENT/ PLAN:   1. Acute abdominal pain/gallstone pancreatitis/calculus of gallbladder with acute cholecystitis/s/p laparoscopic cholecystectomy  Presented with acute abdominal pain  Ultrasound showed cholelithiasis without findings of acute cholecystitis-common bile duct measures 5 mm  Symptoms consistent with passing a stone  Lipase at admission was 8867-107  Seen by general surgery and underwent laparoscopic cholecystectomy with IOC  Denies abdominal pain  Reports ongoing incisional pain  Reports pain is 6/10 before medications and goes down to 3/10 after medications  Continue for pain  Norco 5/325 mg 1 tab every 6 hours as needed-currently taking every 6-8 hours  May also use  Tylenol 500 mg 1-2 tabs every 6 hours as needed  Ibuprofen 600 mg every 6 hours as needed  Max Daily dose of acetaminophen/Tylenol is 3-4 g  Lap sites x 4 to abdomen with Steri-Strips D/I and DAQUAN with bruising noted  Removed all Band-Aids from sites  Reports occasional nausea/no vomiting  Denies fevers/reports chills last night-to check temperature when having chills and notify general surgery if temperature is greater than 100.5  Recommended  Incentive spirometer 5-10 times per hour WA to prevent postoperative complications  Up ambulating 5-10 minutes every 30-60 minutes WA to prevent postoperative complications  Continue foot pumps and circles to prevent postoperative complications  Abdominal binder placed for comfort  Tolerating an oral diet  Appetite is good/drinking well  Last BM was 10/30/passing gas  Postop  restrictions discussed  Okay to shower  No submersion  Okay for stairs  No lifting greater than 5 pounds  No bending, pushing, pulling  Follow-up with general surgery PA on 11/13 at 8:45 AM  Establish with new PCP at [facility]-patient to call and make own appointment within 4 weeks  All pertinent hospital records, labs, radiology from current hospitalization reviewed    2. Hypokalemia  Noted with hypokalemia at time of admission with potassium of 3.4  Replaced per electrolyte protocol  Potassium on 10/30 prior to discharge was stable at 3.6  Continue to monitor labs as directed    3. Elevated LFTs  Noted with elevated LFTs at time of admission likely secondary to gallstone pancreatitis      Alk phos 112  Total bili 1.1  LFTs improved during hospitalization after CBD stone passed  LFTs on 10/30 prior to discharge  AST 40  ALT 86  Alk phos 97  Total bili 0.9  Continue to monitor labs as directed    4.  Drug-induced constipation  Likely secondary to pain medication use  Last BM was on 10/30  Does not feel constipated/bloated  Has good bowel sounds present  Recommended  MiraLAX 17 g in 8 ounces of fluid up to 2 times daily or  Senna-S 8.6-50 mg 1-2 tabs up to 2 times daily  Should remain on laxative/stool softener while on oral pain management  Goal is to have soft formed stool per patient's normal regimen  If no improvement in symptoms notify [facility]      No orders of the defined types were placed in this encounter.          Medications & Refills for this Visit:  No outpatient medications have been marked as taking for the 11/1/24 encounter (Office Visit) with Brianda Pastrana APRN.     Requested Prescriptions      No prescriptions requested or ordered in this encounter         Health Maintenance:  Health Maintenance   Topic Date Due    Annual Physical  Never done    Colorectal Cancer Screening  Never done    Mammogram  Never done    DTaP,Tdap,and Td Vaccines (1 - Tdap) Never done    Pap Smear   Never done    Zoster Vaccines (1 of 2) Never done    COVID-19 Vaccine (1 - 2023-24 season) Never done    Influenza Vaccine (1) 10/01/2024    Annual Depression Screening  Completed    Pneumococcal Vaccine: Birth to 64yrs  Aged Out       Chronic Care Management Referral: N/A      Transitional Care Management Certification:  During the visit, I accessed [employer] and/or Care Everywhere and personally reviewed the following for the recent hospitalization: provider notes, consults, summaries, labs and other test results and the pertinent findings are documented below.     Medication Reconciliation:  I am aware of an inpatient discharge within the last 30 days.  The discharge medication list has been reconciled with the patient's current medication list and reviewed by me.  See medication list for additions of new medication, and changes to current doses of medications and discontinued medications.        Discharge Disposition/Plan:     Future Appointments   Date Time Provider Department Center   11/13/2024  8:45 AM [facility] GEN SURG PA [id number]      1.  Transitional Care Clinic Visit: Next visit Discharged  2.  PCP Visit: Patient to make own PCP follow-up per her request  3.  Chronic Care Management: N/A     BALJIT Rachel, 10/31/2024  [facility]  [phone]         [1] No Known Allergies

## 2024-11-01 NOTE — PROGRESS NOTES
The patient was seen for a hospital follow up appointment at the [facility] today, 11/1/2024. Nurse care manager closing encounter.

## 2024-11-01 NOTE — PATIENT INSTRUCTIONS
For constipation:  Miralax 17 g in 8 ounces of fluid 1-2 times per day or  Senna-s 8.6-50mg 1-2 tabs up to 2 times per day  Continue on something while on pain medications

## 2024-11-12 ENCOUNTER — OFFICE VISIT (OUTPATIENT)
Facility: LOCATION | Age: 54
End: 2024-11-12
Payer: COMMERCIAL

## 2024-11-12 VITALS
SYSTOLIC BLOOD PRESSURE: 121 MMHG | WEIGHT: 224 LBS | HEIGHT: 62 IN | HEART RATE: 61 BPM | TEMPERATURE: 99 F | BODY MASS INDEX: 41.22 KG/M2 | DIASTOLIC BLOOD PRESSURE: 67 MMHG | OXYGEN SATURATION: 97 %

## 2024-11-12 DIAGNOSIS — Z98.890 POST-OPERATIVE STATE: Primary | ICD-10-CM

## 2024-11-12 PROCEDURE — 3078F DIAST BP <80 MM HG: CPT

## 2024-11-12 PROCEDURE — 3074F SYST BP LT 130 MM HG: CPT

## 2024-11-12 PROCEDURE — 99024 POSTOP FOLLOW-UP VISIT: CPT

## 2024-11-12 PROCEDURE — 3008F BODY MASS INDEX DOCD: CPT

## 2024-11-12 NOTE — PROGRESS NOTES
Post Operative Visit Note       Active Problems  1. Post-operative state         Chief Complaint   Chief Complaint   Patient presents with    Post-Op     PO - 10/30 w/Dr. Mccormack, LAPAROSCOPIC CHOLECYSTECTOMY WITH INTRAOPERATIVE CHOLANGIOGRAM, no symptoms.          History of Present Illness   54 year old female presenting for follow-up visit s/p laparoscopic cholecystectomy on 10/30/2024 with Dr. Mccormack.    Patient states she is doing overall well.  She reports preoperative symptoms have resolved. She is tolerating a regular diet without nausea or vomiting.  She reports having up to 3 bowel movements a day.  She denies constipation or diarrhea.  She denies fever and chills.      Allergies  Valeria has No Known Allergies.    Past Medical / Surgical / Social / Family History    The past medical and past surgical history have been reviewed by me today.     Past Medical History:    Allergic rhinitis     Past Surgical History:   Procedure Laterality Date    Cholecystectomy  10/30/2024      2002 & 2000       The family history and social history have been reviewed by me today.    Family History   Problem Relation Age of Onset    Prostate Cancer Father     Heart Disease Father     Heart Disease Brother      Social History     Socioeconomic History    Marital status: Single   Tobacco Use    Smoking status: Never    Smokeless tobacco: Never   Vaping Use    Vaping status: Never Used   Substance and Sexual Activity    Alcohol use: Never    Drug use: Never   Other Topics Concern    Caffeine Concern No    Exercise No    Seat Belt No    Stress Concern No    Weight Concern No        Current Outpatient Medications:     Multiple Vitamins-Minerals (MULTI-VITAMIN/MINERALS) Oral Tab, Take 1 tablet by mouth daily., Disp: , Rfl:     Cholecalciferol (VITAMIN D) 50 MCG (2000 UT) Oral Cap, Take 1 capsule (2,000 Units total) by mouth daily., Disp: , Rfl:     HYDROcodone-acetaminophen 5-325 MG Oral Tab, Take 1 tablet by mouth every  6 (six) hours as needed. (Patient not taking: Reported on 11/12/2024), Disp: 15 tablet, Rfl: 0      Review of Systems  A 10 point Review of Systems has been completed by me today and is negative except as above in the HPI.    Physical Findings   /67 (BP Location: Right arm, Patient Position: Sitting, Cuff Size: adult)   Pulse 61   Temp 98.6 °F (37 °C) (Temporal)   Ht 62\"   Wt 224 lb (101.6 kg)   SpO2 97%   BMI 40.97 kg/m²   Gen/psych: alert and oriented, cooperative, no apparent distress  Cardiovascular: regular rate  Respiratory: respirations unlabored, no wheeze  Abdominal: soft, non-tender, non-distended, no guarding/rebound  Incisions: Clean, dry, intact.  No erythema, surrounding edema, or drainage.         Assessment/Plan  1. Post-operative state      Continue regular diet as tolerated.  Continue analgesics as needed.   No lifting heavier than 20 pounds until 6 weeks postoperatively (12/11/2024).   Discussed pathology with patient.   All questions and concerns were addressed.  Follow-up as needed.    Pathology Report:  Gallbladder, cholecystectomy:  -Acute on chronic cholecystitis with cholesterolosis and cholelithiasis.    Follow Up  Return if symptoms worsen or fail to improve.    LEATHA Pereira  [facility] General Surgery  11/12/2024  2:36 PM

## 2024-12-18 ENCOUNTER — OFFICE VISIT (OUTPATIENT)
Dept: INTERNAL MEDICINE CLINIC | Facility: CLINIC | Age: 54
End: 2024-12-18
Payer: COMMERCIAL

## 2024-12-18 VITALS
WEIGHT: 216.19 LBS | HEART RATE: 62 BPM | RESPIRATION RATE: 16 BRPM | DIASTOLIC BLOOD PRESSURE: 76 MMHG | OXYGEN SATURATION: 99 % | TEMPERATURE: 98 F | BODY MASS INDEX: 39.78 KG/M2 | SYSTOLIC BLOOD PRESSURE: 122 MMHG | HEIGHT: 62 IN

## 2024-12-18 DIAGNOSIS — Z12.11 ENCOUNTER FOR FIT (FECAL IMMUNOCHEMICAL TEST) SCREENING: ICD-10-CM

## 2024-12-18 DIAGNOSIS — Z12.31 ENCOUNTER FOR SCREENING MAMMOGRAM FOR MALIGNANT NEOPLASM OF BREAST: ICD-10-CM

## 2024-12-18 DIAGNOSIS — E55.9 VITAMIN D DEFICIENCY: ICD-10-CM

## 2024-12-18 DIAGNOSIS — Z00.00 ROUTINE PHYSICAL EXAMINATION: Primary | ICD-10-CM

## 2024-12-18 PROBLEM — E87.6 HYPOKALEMIA: Status: RESOLVED | Noted: 2024-10-31 | Resolved: 2024-12-18

## 2024-12-18 PROBLEM — K85.90 ACUTE PANCREATITIS, UNSPECIFIED COMPLICATION STATUS, UNSPECIFIED PANCREATITIS TYPE (HCC): Status: RESOLVED | Noted: 2024-10-29 | Resolved: 2024-12-18

## 2024-12-18 PROBLEM — K59.03 DRUG-INDUCED CONSTIPATION: Status: RESOLVED | Noted: 2024-11-01 | Resolved: 2024-12-18

## 2024-12-18 PROBLEM — R10.13 EPIGASTRIC PAIN: Status: RESOLVED | Noted: 2024-10-28 | Resolved: 2024-12-18

## 2024-12-18 PROBLEM — R79.89 ELEVATED LFTS: Status: RESOLVED | Noted: 2024-10-31 | Resolved: 2024-12-18

## 2024-12-18 PROBLEM — R10.9 ACUTE ABDOMINAL PAIN: Status: RESOLVED | Noted: 2024-10-31 | Resolved: 2024-12-18

## 2024-12-18 PROBLEM — K80.00 CALCULUS OF GALLBLADDER WITH ACUTE CHOLECYSTITIS WITHOUT OBSTRUCTION: Status: RESOLVED | Noted: 2024-10-30 | Resolved: 2024-12-18

## 2024-12-18 NOTE — PATIENT INSTRUCTIONS
Continue to exercise at least 150 minutes a week and Eat a plant based diet     Please take 2000 IU of vitamin D daily for life to keep your bones strong    Please see your dentist every 6 months  Continue with your regular eye exams    Please check with your insurance to see if the FIT test (for colon cancer screening) and mammogram (for breast cancer screening) is covered.    Please have blood tests done. No need to fast per new guidelines    You received the flu vaccine today and I highly recommend the shingrix vaccine. It is a 2 dose series, but it is a strong one and will make you tired for some time     See us yearly for a physical

## 2024-12-18 NOTE — PROGRESS NOTES
Patient Office Visit    ASSESSMENT AND PLAN:   1. Routine physical examination  Note: Continue to exercise at least 150 minutes a week and Eat a plant based diet. Please take 2000 IU of vitamin D daily for life to keep your bones strong. Please see your dentist every 6 months. Flu vaccine provided. Recommend the shingles vaccine   - ALT(SGPT); Future  - Basic Metabolic Panel (8); Future  - AST (SGOT); Future  - Lipid Panel; Future  - CBC With Differential With Platelet; Future  - Hemoglobin A1C; Future  - TSH W Reflex To Free T4; Future    2. BMI 39.0-39.9,adult  Note: continue to work on diet and lifestyle modification   - Hemoglobin A1C; Future  - TSH W Reflex To Free T4; Future    3. Vitamin D deficiency  Note: Please take 2000 IU of vitamin D daily for life to keep your bones strong   - Vitamin D; Future    4. Encounter for FIT (fecal immunochemical test) screening  - Occult Blood, Fecal, Immunoassay (Blue cards) [E]; Future    5. Encounter for screening mammogram for malignant neoplasm of breast  - [facility] LIUDMILA 2D+3D SCREENING BILAT (CPT=77067/76024); Future    RTC in 1 year for a physical       Patient/Caregiver Education: Patient/Caregiver Education: There are no barriers to learning. Medical education done. Outcome: Patient verbalizes understanding. Patient is notified to call with any questions, complications, allergies, or worsening or changing symptoms.  Patient is to call with any side effects or complications from the treatments as a result of today.      Reviewed Past Medical History and   Patient Active Problem List   Diagnosis    Gallstone pancreatitis (HCC)    BMI 39.0-39.9,adult    S/P laparoscopic cholecystectomy       Orders Placed This Encounter   Procedures    ALT(SGPT)     Standing Status:   Future     Standing Expiration Date:   12/18/2025    Basic Metabolic Panel (8)     Standing Status:   Future     Standing Expiration Date:   12/18/2025    AST (SGOT)     Standing Status:   Future     Standing  Expiration Date:   2025    Lipid Panel     Standing Status:   Future     Standing Expiration Date:   2025    CBC With Differential With Platelet     Standing Status:   Future     Standing Expiration Date:   2025    Hemoglobin A1C     Standing Status:   Future     Standing Expiration Date:   2025    TSH W Reflex To Free T4     Standing Status:   Future     Standing Expiration Date:   2025    Vitamin D     Standing Status:   Future     Standing Expiration Date:   2025     Order Specific Question:   Please pick the scenario that best fits the purpose for ordering this test     Answer:   General Screening/Vit D deficiency (25-Hydroxy)     Order Specific Question:   Release to patient     Answer:   Immediate    Occult Blood, Fecal, Immunoassay (Blue cards) [E]     Standing Status:   Future     Standing Expiration Date:   2025     Order Specific Question:   Release to patient     Answer:   Immediate    Fluzone trivalent vaccine, PF 0.5mL, 6mo+ (85893)     Requested Prescriptions      No prescriptions requested or ordered in this encounter         Tressa Rico DO  CC:  Chief Complaint   Patient presents with    [facility]         HPI:   Valeria Hutchins is a 54 year old female who presents for a physical    Acute gall stone pancreatitis: symptoms have resolved and she is doing much better   Obesity: she used to be about 130 lbs, but has gained weight due to stress as she was traveling to see her mother who has dementia. She is working on diet and lifestyle changes     Past Medical History:    Allergic rhinitis       Past Surgical History:   Procedure Laterality Date    Cholecystectomy  10/30/2024      2002 & 2000       Social History:  Social History     Socioeconomic History    Marital status: Single   Tobacco Use    Smoking status: Never    Smokeless tobacco: Never   Vaping Use    Vaping status: Never Used   Substance and Sexual Activity    Alcohol use:  Never    Drug use: Never   Other Topics Concern    Caffeine Concern No    Exercise No    Seat Belt No    Stress Concern No    Weight Concern No     Social Drivers of Health     Food Insecurity: No Food Insecurity (10/29/2024)    Food Insecurity     Food Insecurity: Never true   Transportation Needs: No Transportation Needs (10/29/2024)    Transportation Needs     Lack of Transportation: No   Housing Stability: Low Risk  (10/29/2024)    Housing Stability     Housing Instability: No     Family History:  Family History   Problem Relation Age of Onset    Prostate Cancer Father     Heart Disease Father     Heart Disease Brother      Allergies:  Allergies[1]  Current Meds:  Medications Ordered Prior to Encounter[2]      REVIEW OF SYSTEMS   Constitutional: no fatigue normal energy no weight changes   HENT: normal sinuses and no mouth issues   Eyes: . normal vision no eye pain   Respiratory: normal respirations no cough   Cardiovascular: no CP, or palpitations   Gastrointestinal: normal bowels and no abd pains   Genitourinary:  normal urination no hematuria, no frequency   Musculoskeletal: no pains in arms/legs, normal range of motion   Skin: no rashes or skin lesions that are new   Neurological:  no weakness, no numbness, normal gait   Hematological:  no bruises or bleeding   Psychiatric/Behavioral: normal mood no anxiety normal behavior     /76 (BP Location: Right arm, Patient Position: Sitting, Cuff Size: adult)   Pulse 62   Temp 98 °F (36.7 °C) (Temporal)   Resp 16   Ht 5' 2\" (1.575 m)   Wt 216 lb 3.2 oz (98.1 kg)   SpO2 99%   BMI 39.54 kg/m²     PHYSICAL EXAM:   Constitutional: Vital signs reviewed as noted, well developed, in no acute distress.   HENT: NCAT, bilateral ear canal and tympanic membrane appear normal  Eyes: pupils reactive bilaterally  Neck: No thyroidmegaly  Cardiovascular: nl s1 s2 no m/r/g  Pulmonary/Chest: CTA bilaterally with no wheezes  Abdominal: Soft NT normal Bowel  sounds  Extremities: no pedal edema   Neurological:  no weakness in UE and LE, reflexes are normal  Skin: no rashes or bruises on visualized skin, not undressed   Psychiatric:normal mood              [1] No Known Allergies  [2]   Current Outpatient Medications on File Prior to Visit   Medication Sig Dispense Refill    Multiple Vitamins-Minerals (MULTI-VITAMIN/MINERALS) Oral Tab Take 1 tablet by mouth daily.      Cholecalciferol (VITAMIN D) 50 MCG (2000 UT) Oral Cap Take 1 capsule (2,000 Units total) by mouth daily.       No current facility-administered medications on file prior to visit.

## 2024-12-21 ENCOUNTER — LAB ENCOUNTER (OUTPATIENT)
Dept: LAB | Age: 54
End: 2024-12-21
Attending: INTERNAL MEDICINE
Payer: COMMERCIAL

## 2024-12-21 DIAGNOSIS — Z00.00 ROUTINE PHYSICAL EXAMINATION: ICD-10-CM

## 2024-12-21 DIAGNOSIS — E55.9 VITAMIN D DEFICIENCY: ICD-10-CM

## 2024-12-21 LAB
ALT SERPL-CCNC: 20 U/L
ANION GAP SERPL CALC-SCNC: 6 MMOL/L (ref 0–18)
AST SERPL-CCNC: 19 U/L (ref ?–34)
BASOPHILS # BLD AUTO: 0.05 X10(3) UL (ref 0–0.2)
BASOPHILS NFR BLD AUTO: 0.7 %
BUN BLD-MCNC: 21 MG/DL (ref 9–23)
CALCIUM BLD-MCNC: 10 MG/DL (ref 8.7–10.4)
CHLORIDE SERPL-SCNC: 109 MMOL/L (ref 98–112)
CHOLEST SERPL-MCNC: 171 MG/DL (ref ?–200)
CO2 SERPL-SCNC: 25 MMOL/L (ref 21–32)
CREAT BLD-MCNC: 0.72 MG/DL
EGFRCR SERPLBLD CKD-EPI 2021: 98 ML/MIN/1.73M2 (ref 60–?)
EOSINOPHIL # BLD AUTO: 0.41 X10(3) UL (ref 0–0.7)
EOSINOPHIL NFR BLD AUTO: 6.1 %
ERYTHROCYTE [DISTWIDTH] IN BLOOD BY AUTOMATED COUNT: 13.2 %
EST. AVERAGE GLUCOSE BLD GHB EST-MCNC: 111 MG/DL (ref 68–126)
FASTING PATIENT LIPID ANSWER: YES
FASTING STATUS PATIENT QL REPORTED: YES
GLUCOSE BLD-MCNC: 87 MG/DL (ref 70–99)
HBA1C MFR BLD: 5.5 % (ref ?–5.7)
HCT VFR BLD AUTO: 39.8 %
HDLC SERPL-MCNC: 58 MG/DL (ref 40–59)
HGB BLD-MCNC: 13.2 G/DL
IMM GRANULOCYTES # BLD AUTO: 0.02 X10(3) UL (ref 0–1)
IMM GRANULOCYTES NFR BLD: 0.3 %
LDLC SERPL CALC-MCNC: 98 MG/DL (ref ?–100)
LYMPHOCYTES # BLD AUTO: 2.14 X10(3) UL (ref 1–4)
LYMPHOCYTES NFR BLD AUTO: 31.7 %
MCH RBC QN AUTO: 29.6 PG (ref 26–34)
MCHC RBC AUTO-ENTMCNC: 33.2 G/DL (ref 31–37)
MCV RBC AUTO: 89.2 FL
MONOCYTES # BLD AUTO: 0.63 X10(3) UL (ref 0.1–1)
MONOCYTES NFR BLD AUTO: 9.3 %
NEUTROPHILS # BLD AUTO: 3.51 X10 (3) UL (ref 1.5–7.7)
NEUTROPHILS # BLD AUTO: 3.51 X10(3) UL (ref 1.5–7.7)
NEUTROPHILS NFR BLD AUTO: 51.9 %
NONHDLC SERPL-MCNC: 113 MG/DL (ref ?–130)
OSMOLALITY SERPL CALC.SUM OF ELEC: 292 MOSM/KG (ref 275–295)
PLATELET # BLD AUTO: 286 10(3)UL (ref 150–450)
POTASSIUM SERPL-SCNC: 4.2 MMOL/L (ref 3.5–5.1)
RBC # BLD AUTO: 4.46 X10(6)UL
SODIUM SERPL-SCNC: 140 MMOL/L (ref 136–145)
TRIGL SERPL-MCNC: 82 MG/DL (ref 30–149)
TSI SER-ACNC: 1.03 UIU/ML (ref 0.55–4.78)
VIT D+METAB SERPL-MCNC: 39.7 NG/ML (ref 30–100)
VLDLC SERPL CALC-MCNC: 13 MG/DL (ref 0–30)
WBC # BLD AUTO: 6.8 X10(3) UL (ref 4–11)

## 2024-12-21 PROCEDURE — 83036 HEMOGLOBIN GLYCOSYLATED A1C: CPT | Performed by: INTERNAL MEDICINE

## 2024-12-21 PROCEDURE — 84450 TRANSFERASE (AST) (SGOT): CPT | Performed by: INTERNAL MEDICINE

## 2024-12-21 PROCEDURE — 84443 ASSAY THYROID STIM HORMONE: CPT | Performed by: INTERNAL MEDICINE

## 2024-12-21 PROCEDURE — 85025 COMPLETE CBC W/AUTO DIFF WBC: CPT | Performed by: INTERNAL MEDICINE

## 2024-12-21 PROCEDURE — 80061 LIPID PANEL: CPT | Performed by: INTERNAL MEDICINE

## 2024-12-21 PROCEDURE — 82306 VITAMIN D 25 HYDROXY: CPT | Performed by: INTERNAL MEDICINE

## 2024-12-21 PROCEDURE — 80048 BASIC METABOLIC PNL TOTAL CA: CPT | Performed by: INTERNAL MEDICINE

## 2024-12-21 PROCEDURE — 84460 ALANINE AMINO (ALT) (SGPT): CPT | Performed by: INTERNAL MEDICINE

## 2025-02-04 ENCOUNTER — OFFICE VISIT (OUTPATIENT)
Dept: OBGYN CLINIC | Facility: CLINIC | Age: 55
End: 2025-02-04
Payer: COMMERCIAL

## 2025-02-04 VITALS
DIASTOLIC BLOOD PRESSURE: 74 MMHG | BODY MASS INDEX: 39.56 KG/M2 | HEART RATE: 61 BPM | WEIGHT: 215 LBS | SYSTOLIC BLOOD PRESSURE: 132 MMHG | HEIGHT: 62 IN

## 2025-02-04 DIAGNOSIS — Z11.51 SCREENING FOR HUMAN PAPILLOMAVIRUS (HPV): ICD-10-CM

## 2025-02-04 DIAGNOSIS — L90.0 LICHEN SCLEROSUS: ICD-10-CM

## 2025-02-04 DIAGNOSIS — Z01.419 WELL WOMAN EXAM WITH ROUTINE GYNECOLOGICAL EXAM: Primary | ICD-10-CM

## 2025-02-04 DIAGNOSIS — Z12.4 SCREENING FOR CERVICAL CANCER: ICD-10-CM

## 2025-02-04 PROCEDURE — 88175 CYTOPATH C/V AUTO FLUID REDO: CPT | Performed by: NURSE PRACTITIONER

## 2025-02-04 PROCEDURE — 3078F DIAST BP <80 MM HG: CPT | Performed by: NURSE PRACTITIONER

## 2025-02-04 PROCEDURE — 3075F SYST BP GE 130 - 139MM HG: CPT | Performed by: NURSE PRACTITIONER

## 2025-02-04 PROCEDURE — 99459 PELVIC EXAMINATION: CPT | Performed by: NURSE PRACTITIONER

## 2025-02-04 PROCEDURE — 3008F BODY MASS INDEX DOCD: CPT | Performed by: NURSE PRACTITIONER

## 2025-02-04 PROCEDURE — 87624 HPV HI-RISK TYP POOLED RSLT: CPT | Performed by: NURSE PRACTITIONER

## 2025-02-04 PROCEDURE — 99212 OFFICE O/P EST SF 10 MIN: CPT | Performed by: NURSE PRACTITIONER

## 2025-02-04 PROCEDURE — 99396 PREV VISIT EST AGE 40-64: CPT | Performed by: NURSE PRACTITIONER

## 2025-02-04 RX ORDER — MAGNESIUM 200 MG
TABLET ORAL
COMMUNITY

## 2025-02-04 RX ORDER — CLOBETASOL PROPIONATE 0.5 MG/G
OINTMENT TOPICAL
Qty: 60 G | Refills: 1 | Status: SHIPPED | OUTPATIENT
Start: 2025-02-04 | End: 2025-02-07

## 2025-02-04 NOTE — PROGRESS NOTES
Subjective:  Chief Complaint   Patient presents with    Annual     54 year old female  presents for annual.    Also concerned with irritation  Especially at night  Had previously been prescribed cream but was not consistent in use    No LMP recorded. Patient is postmenopausal.  Hx Prior Abnormal Pap: No  Pap Date: 22  Pap Result Notes: wnl  Menarche: 13 (2025  4:47 PM)  Period Cycle (Days): postmenopause (2025  4:47 PM)  Period Duration (Days): na (2025  4:47 PM)  Period Flow: na (2025  4:47 PM)  Use of Birth Control (if yes, specify type): Postmenopausal (2025  4:47 PM)  Hx Prior Abnormal Pap: No (2025  4:47 PM)  Pap Date: 22 (2025  4:47 PM)  Pap Result Notes: wnl (2025  4:47 PM)    Last Mammo:  At age 48    Denies family history of breast, ovarian and colon CA.    Feeling safe at home.    Most Recent Immunizations   Administered Date(s) Administered    FLUZONE 6 months and older PFS 0.5 ml (61324) 2020    Influenza Vaccine, trivalent (IIV3), PF 0.5mL (08385) 2024   Deferred Date(s) Deferred    Influenza Vaccine, trivalent (IIV3), PF 0.5mL (80152) 10/30/2024      reports that she has never smoked. She has never used smokeless tobacco.   reports no history of alcohol use.    Past Medical History:    Allergic rhinitis     Past Surgical History:   Procedure Laterality Date    Cholecystectomy  10/30/2024      2002 & 2000       Review of Systems:  Pertinent items are noted in the HPI.    Objective:  /74   Pulse 61   Ht 62\"   Wt 215 lb (97.5 kg)   BMI 39.32 kg/m²    Physical Examination:  General appearance: Well dressed, well nourished in no apparent distress  Neurologic/Psychiatric: Alert and oriented to person, place and time, mood normal, affect appropriate  Head: Normocephalic without obvious deformity, atraumatic  Neck: No thyromegaly, supple, non-tender, no masses, no adenopathy  Lungs: Clear to auscultation bilaterally, no rales,  wheezes or rhonchi  Breasts: Symmetric, non-tender, no masses, lesions, retraction, dimpling or discharge bilaterally, no axillary or supraclavicular lymphadenopathy  Heart:: Regular rate and rhythm, no gallops or murmurs  Abdomen: Soft, non-tender, non-distended, no masses, no hepatosplenomegaly, no hernias, no inguinal lymphadenopathy  Pelvic:    External genitalia- + white shinny plaques to bilateral labia and perianal area, Bartholin's, urethra, skeins glands normal   Vagina- No vaginal lesions, physiologic discharge   Urethra- Non-tender, no masses   Urethral Meatus- No lesions or masses, no prolapse   Bladder- Non-tender, no masses   Cervix- No lesions, long/closed, no cervical motion tenderness   Uterus- Normal sized, non-tender, no masses   Adnexa-  Non-tender, no masses   Anus/Perineum- Normal, no masses or lesions  Extremities: Non-tender, full range of motion, no clubbing, cyanosis or edema  Skin:  General inspection- no rashes, lesions or discoloration      Assessment/Plan:  Normal well-woman exam.  Yearly mammogram ordered by PCP, reminded to complete  Pap smear obtained.    Declined chaperone for exam today     Diagnoses and all orders for this visit:    Well woman exam with routine gynecological exam  - self breast exam discussed and encouraged    Screening for cervical cancer  -     ThinPrep PAP Smear; Future    Screening for human papillomavirus (HPV)  -     Hpv Dna  High Risk , Thin Prep Collect; Future    Lichen sclerosus  -Diagnosis and treatment plan discussed  -Discussed autoimmune nature of disorder   -Increased risk of SCC to affected area discussed  Recommend annual skin exam.  -Discussed treating empirically based on clinical features but may consider bx for definitive diagnosis in the future if no improvement with treatment. Also encouraged pt to bring previous records for review  -START Clobetasol 0.05% Ointment - apply to affected area 1 x daily 4 weeks, then reduce to 2-3 times weekly  for maintenance.  Topical steroid warning given    Return in about 1 year (around 2/4/2026) for annual well woman exam or sooner if needed.

## 2025-02-05 LAB — HPV E6+E7 MRNA CVX QL NAA+PROBE: NEGATIVE

## 2025-02-06 ENCOUNTER — PATIENT MESSAGE (OUTPATIENT)
Dept: OBGYN CLINIC | Facility: CLINIC | Age: 55
End: 2025-02-06

## 2025-02-06 DIAGNOSIS — L90.0 LICHEN SCLEROSUS: ICD-10-CM

## 2025-02-07 RX ORDER — CLOBETASOL PROPIONATE 0.5 MG/G
OINTMENT TOPICAL
Qty: 60 G | Refills: 1 | Status: SHIPPED | OUTPATIENT
Start: 2025-02-07

## 2025-02-07 RX ORDER — CLOBETASOL PROPIONATE 0.5 MG/G
OINTMENT TOPICAL
Qty: 60 G | Refills: 1 | Status: SHIPPED | OUTPATIENT
Start: 2025-02-07 | End: 2025-02-07

## 2025-02-07 NOTE — TELEPHONE ENCOUNTER
Fax received from [facility] asking for clarification on clobetasol ointment.  \"Notes to Prescriber: Specify amount of grams per application per insurance.\"    Routed to JOHNY Sahu- please update prescription as requested.

## 2025-02-20 ENCOUNTER — HOSPITAL ENCOUNTER (OUTPATIENT)
Dept: MAMMOGRAPHY | Age: 55
Discharge: HOME | End: 2025-02-20
Attending: INTERNAL MEDICINE
Payer: COMMERCIAL

## 2025-02-20 DIAGNOSIS — Z12.31 ENCOUNTER FOR SCREENING MAMMOGRAM FOR MALIGNANT NEOPLASM OF BREAST: ICD-10-CM

## 2025-02-20 PROCEDURE — 77063 BREAST TOMOSYNTHESIS BI: CPT | Performed by: INTERNAL MEDICINE

## 2025-02-20 PROCEDURE — 77067 SCR MAMMO BI INCL CAD: CPT | Performed by: INTERNAL MEDICINE

## 2025-05-12 ENCOUNTER — OFFICE VISIT (OUTPATIENT)
Dept: INTERNAL MEDICINE CLINIC | Facility: CLINIC | Age: 55
End: 2025-05-12
Payer: COMMERCIAL

## 2025-05-12 VITALS
SYSTOLIC BLOOD PRESSURE: 132 MMHG | DIASTOLIC BLOOD PRESSURE: 62 MMHG | RESPIRATION RATE: 14 BRPM | HEART RATE: 51 BPM | WEIGHT: 218.63 LBS | OXYGEN SATURATION: 97 % | TEMPERATURE: 99 F | HEIGHT: 62 IN | BODY MASS INDEX: 40.23 KG/M2

## 2025-05-12 DIAGNOSIS — L30.9 DERMATITIS: Primary | ICD-10-CM

## 2025-05-12 PROCEDURE — G2211 COMPLEX E/M VISIT ADD ON: HCPCS | Performed by: INTERNAL MEDICINE

## 2025-05-12 PROCEDURE — 3078F DIAST BP <80 MM HG: CPT | Performed by: INTERNAL MEDICINE

## 2025-05-12 PROCEDURE — 3075F SYST BP GE 130 - 139MM HG: CPT | Performed by: INTERNAL MEDICINE

## 2025-05-12 PROCEDURE — 99213 OFFICE O/P EST LOW 20 MIN: CPT | Performed by: INTERNAL MEDICINE

## 2025-05-12 PROCEDURE — 3008F BODY MASS INDEX DOCD: CPT | Performed by: INTERNAL MEDICINE

## 2025-05-12 NOTE — PROGRESS NOTES
Patient Office Visit    ASSESSMENT AND PLAN:   1. Dermatitis  Note: Advised patient to use Vanicream or CeraVe.  If it gets very severe then she may use hydrocortisone cream.  Asymptomatic today and follow-up if new symptoms return    Reminded patient to do the fit test for colon cancer screening    RTC as needed for above     Patient/Caregiver Education: Patient/Caregiver Education: There are no barriers to learning. Medical education done. Outcome: Patient verbalizes understanding. Patient is notified to call with any questions, complications, allergies, or worsening or changing symptoms.  Patient is to call with any side effects or complications from the treatments as a result of today.      Reviewed Past Medical History and   Problem List[1]    No orders of the defined types were placed in this encounter.    Requested Prescriptions      No prescriptions requested or ordered in this encounter         Tressa Rico DO  CC:  Chief Complaint   Patient presents with    Dryness         HPI:   Valeria Hutchins is a 54-year-old female who presents with dry skin around the right eye and left arm.  It happened about a month ago.  She uses Ponds cream and then she noticed some flakiness.  She has never had any skin issues.  She used neomycin and it improved.  She is not using any Ponds cream and was wondering if she can just use collagen tablets.    Past Medical History[2]    Past Surgical History[3]    Social History:  Short Social Hx on File[4]  Family History:  Family History[5]  Allergies:  Allergies[6]  Current Meds:  Medications Ordered Prior to Encounter[7]      REVIEW OF SYSTEMS   Constitutional: no fatigue normal energy no weight changes   HENT: normal sinuses and no mouth issues   Eyes: . normal vision no eye pain   Respiratory: normal respirations no cough   Cardiovascular: no CP, or palpitations   Gastrointestinal: normal bowels and no abd pains   Genitourinary:  normal urination no hematuria, no frequency    Musculoskeletal: no pains in arms/legs, normal range of motion   Skin: As above  Neurological:  no weakness, no numbness, normal gait   Hematological:  no bruises or bleeding   Psychiatric/Behavioral: normal mood no anxiety normal behavior     /62 (BP Location: Left arm, Patient Position: Sitting, Cuff Size: adult)   Pulse 51   Temp 98.7 °F (37.1 °C) (Temporal)   Resp 14   Ht 5' 2\" (1.575 m)   Wt 218 lb 9.6 oz (99.2 kg)   SpO2 97%   BMI 39.98 kg/m²     PHYSICAL EXAM:   Constitutional: Vital signs reviewed as noted, well developed, in no acute distress.   HENT: NCAT  Eyes: pupils reactive bilaterally  Cardiovascular: nl s1 s2 no m/r/g  Pulmonary/Chest: CTA bilaterally with no wheezes  Extremities: no pedal edema   Neurological:  no weakness in UE and LE, reflexes are normal  Skin: no rashes or bruises on visualized skin, not undressed   Psychiatric:normal mood              [1]   Patient Active Problem List  Diagnosis    Gallstone pancreatitis (HCC)    BMI 39.0-39.9,adult    S/P laparoscopic cholecystectomy    Lichen sclerosus   [2]   Past Medical History:   Allergic rhinitis   [3]   Past Surgical History:  Procedure Laterality Date    Cholecystectomy  10/30/2024      2002 & 2000   [4]   Social History  Socioeconomic History    Marital status: Single   Tobacco Use    Smoking status: Never    Smokeless tobacco: Never   Vaping Use    Vaping status: Never Used   Substance and Sexual Activity    Alcohol use: Never    Drug use: Never    Sexual activity: Not Currently     Partners: Male   Other Topics Concern    Caffeine Concern Yes    Exercise Yes    Seat Belt Yes    Stress Concern No    Weight Concern No     Social Drivers of Health     Food Insecurity: No Food Insecurity (10/29/2024)    Food Insecurity     Food Insecurity: Never true   Transportation Needs: No Transportation Needs (10/29/2024)    Transportation Needs     Lack of Transportation: No   Housing Stability: Low Risk  (10/29/2024)     Housing Stability     Housing Instability: No   [5]   Family History  Problem Relation Age of Onset    Prostate Cancer Father     Heart Disease Father     Cancer Father     Heart Disorder Father     Heart Disease Brother     Dementia Mother     Diabetes Maternal Grandfather     Diabetes Paternal Grandfather    [6] No Known Allergies  [7]   Current Outpatient Medications on File Prior to Visit   Medication Sig Dispense Refill    clobetasol 0.05 % External Ointment Apply 1 fingertip (0.25 grams) to affected area daily 4 weeks, then reduce to 2-3 times weekly for maintenance 60 g 1    Magnesium 200 MG Oral Tab Take by mouth.      Multiple Vitamins-Minerals (MULTI-VITAMIN/MINERALS) Oral Tab Take 1 tablet by mouth daily.      Cholecalciferol (VITAMIN D) 50 MCG (2000 UT) Oral Cap Take 1 capsule (2,000 Units total) by mouth daily.       No current facility-administered medications on file prior to visit.

## 2025-05-12 NOTE — PATIENT INSTRUCTIONS
Try vanicream or cera ve on face or body    Hydrocortisone 1 percent cream with flaky skin    Face wash  Moisturizer  Vitamin C cream  Retinoid at night (every other night)

## (undated) DEVICE — SUT COAT VCRL + 0 54IN ABSRB UD ANTIBACT

## (undated) DEVICE — BANDAGE ADH 1INX3IN NAT FAB N ADH PD CURAD

## (undated) DEVICE — LAP CHOLE/APPY CDS-LF: Brand: MEDLINE INDUSTRIES, INC.

## (undated) DEVICE — ENDOPATH ULTRA VERESS INSUFFLATION NEEDLES WITH LUER LOCK CONNECTORS: Brand: ENDOPATH

## (undated) DEVICE — TRADITIONAL MARYLAND DISSECTOR TIP, DISPOSABLE: Brand: RENEW

## (undated) DEVICE — NEPTUNE E-SEP SMOKE EVACUATION PENCIL, COATED, 70MM BLADE, PUSH BUTTON SWITCH: Brand: NEPTUNE E-SEP

## (undated) DEVICE — Device: Brand: SUTURE PASSOR PRO

## (undated) DEVICE — TROCAR: Brand: KII® SLEEVE

## (undated) DEVICE — UNDYED BRAIDED (POLYGLACTIN 910), SYNTHETIC ABSORBABLE SUTURE: Brand: COATED VICRYL

## (undated) DEVICE — CLIP APPLIER WITH CLIP LOGIC TECHNOLOGY: Brand: ENDO CLIP III

## (undated) DEVICE — TROCAR: Brand: KII SHIELDED BLADED ACCESS SYSTEM

## (undated) DEVICE — SLEEVE COMPR MD KNEE LEN SGL USE KENDALL SCD

## (undated) DEVICE — SHEET,DRAPE,40X58,STERILE: Brand: MEDLINE

## (undated) DEVICE — 40580 - THE PINK PAD - ADVANCED TRENDELENBURG POSITIONING KIT: Brand: 40580 - THE PINK PAD - ADVANCED TRENDELENBURG POSITIONING KIT

## (undated) DEVICE — DALE ABDOMINAL BINDER, 12" WIDE, STRETCHES TO FIT 30"-45", 1 PER BOX.: Brand: DALE ABDOMINAL BINDER

## (undated) DEVICE — SUT COAT VCRL+ 0 27IN UR-6 ABSRB VLT ANTIBACT

## (undated) DEVICE — GLOVE SUR 6.5 SENSICARE PI PIP CRM PWD F

## (undated) DEVICE — CATHETER URET 5FR L70CM FLX OPN TIP NONPORTED

## (undated) DEVICE — L-HOOK CAUTERY PROBE TIP, DISPOSABLE: Brand: RENEW

## (undated) DEVICE — GUIDEWIRE .035X150 STR ZIPWIRE

## (undated) DEVICE — GRABBER GRASPER TIP, DISPOSABLE: Brand: RENEW

## (undated) DEVICE — COVER,LIGHT,CAMERA,HARD,1/PK,STRL: Brand: MEDLINE

## (undated) DEVICE — SOLUTION IRRIG 1000ML 0.9% NACL USP BTL

## (undated) DEVICE — GOLDVAC PUSH BUTTON ELECTROSURGICAL SMOKE EVACUATION HANDPIECE: Brand: GOLDVAC

## (undated) DEVICE — POUCH SPECIMEN WIRE 6X3 250ML

## (undated) DEVICE — VISUALIZATION SYSTEM: Brand: CLEARIFY

## (undated) DEVICE — #11 STERILE BLADE: Brand: POLYMER COATED BLADES

## (undated) DEVICE — C-ARM: Brand: UNBRANDED

## (undated) DEVICE — MINI ENDOCUT SCISSOR TIP, DISPOSABLE: Brand: RENEW

## (undated) DEVICE — APPLICATOR PREP 26ML CHG 2% ISO ALC 70%

## (undated) DEVICE — LAPCLINCH GRASPER TIP, DISPOSABLE: Brand: RENEW

## (undated) NOTE — LETTER
[address]  [id number]  Authorization for Surgical Operation and Procedure     Date:_10/30/2024__                                                                                                         Time:__1024_  I hereby authorize Surgeon(s):  Joselin Mccormack MD, my physician and his/her assistants (if applicable), which may include medical students, residents, and/or fellows, to perform the following surgical operation/ procedure and administer such anesthesia as may be determined necessary by my physician:  Operation/Procedure name (s) Procedure(s):  LAPAROSCOPIC CHOLECYSTECTOMY WITH INTRAOPERATIVE CHOLANGIOGRAMS on Valeria Hutchins   2.   I recognize that during the surgical operation/procedure, unforeseen conditions may necessitate additional or different procedures than those listed above.  I, therefore, further authorize and request that the above-named surgeon, assistants, or designees perform such procedures as are, in their judgment, necessary and desirable.    3.   My surgeon/physician has discussed prior to my surgery the potential benefits, risks and side effects of this procedure; the likelihood of achieving goals; and potential problems that might occur during recuperation.  They also discussed reasonable alternatives to the procedure, including risks, benefits, and side effects related to the alternatives and risks related to not receiving this procedure.  I have had all my questions answered and I acknowledge that no guarantee has been made as to the result that may be obtained.    4.   Should the need arise during my operation/procedure, which includes change of level of care prior to discharge, I also consent to the administration of blood and/or blood products.  Further, I understand that despite careful testing and screening of blood or blood products by collecting agencies, I may still be subject to ill effects as a result of receiving a blood  transfusion and/or blood products.  The following are some, but not all, of the potential risks that can occur: fever and allergic reactions, hemolytic reactions, transmission of diseases such as Hepatitis, AIDS and Cytomegalovirus (CMV) and fluid overload.  In the event that I wish to have an autologous transfusion of my own blood, or a directed donor transfusion, I will discuss this with my physician.  Check only if Refusing Blood or Blood Products  I understand refusal of blood or blood products as deemed necessary by my physician may have serious consequences to my condition to include possible death. I hereby assume responsibility for my refusal and release the hospital, its personnel, and my physicians from any responsibility for the consequences of my refusal.          o  Refuse      5.   I authorize the use of any specimen, organs, tissues, body parts or foreign objects that may be removed from my body during the operation/procedure for diagnosis, research or teaching purposes and their subsequent disposal by hospital authorities.  I also authorize the release of specimen test results and/or written reports to my treating physician on the hospital medical staff or other referring or consulting physicians involved in my care, at the discretion of the Pathologist or my treating physician.    6.   I consent to the photographing or videotaping of the operations or procedures to be performed, including appropriate portions of my body for medical, scientific, or educational purposes, provided my identity is not revealed by the pictures or by descriptive texts accompanying them.  If the procedure has been photographed/videotaped, the surgeon will obtain the original picture, image, videotape or CD.  The hospital will not be responsible for storage, release or maintenance of the picture, image, tape or CD.    7.   I consent to the presence of a  or observers in the operating room as deemed  necessary by my physician or their designees.    8.   I recognize that in the event my procedure results in extended X-Ray/fluoroscopy time, I may develop a skin reaction.    9. If I have a Do Not Attempt Resuscitation (DNAR) order in place, that status will be suspended while in the operating room, procedural suite, and during the recovery period unless otherwise explicitly stated by me (or a person authorized to consent on my behalf). The surgeon or my attending physician will determine when the applicable recovery period ends for purposes of reinstating the DNAR order.  10. Patients having a sterilization procedure: I understand that if the procedure is successful the results will be permanent and it will therefore be impossible for me to inseminate, conceive, or bear children.  I also understand that the procedure is intended to result in sterility, although the result has not been guaranteed.   11. I acknowledge that my physician has explained sedation/analgesia administration to me including the risk and benefits I consent to the administration of sedation/analgesia as may be necessary or desirable in the judgment of my physician.    I CERTIFY THAT I HAVE READ AND FULLY UNDERSTAND THE ABOVE CONSENT TO OPERATION and/or OTHER PROCEDURE.    _________________________________________  __________________________________  Signature of Patient     Signature of Responsible Person         ___________________________________         Printed Name of Responsible Person           _________________________________                 Relationship to Patient  _________________________________________  ______________________________  Signature of Witness          Date  Time      Patient Name: Valeria Hutchins     : 1970                 Printed: 2024     Medical Record #: [id number]                     Page 1 of 2                                    [address]   [id number]    Consent for Anesthesia    IValeria agree to be cared for by an anesthesiologist, who is specially trained to monitor me and give me medicine to put me to sleep or keep me comfortable during my procedure    I understand that my anesthesiologist is not an employee or agent of [facility] or [employer]. He or she works for [employer].    As the patient asking for anesthesia services, I agree to:  Allow the anesthesiologist (anesthesia doctor) to give me medicine and do additional procedures as necessary. Some examples are: Starting or using an “IV” to give me medicine, fluids or blood during my procedure, and having a breathing tube placed to help me breathe when I’m asleep (intubation). In the event that my heart stops working properly, I understand that my anesthesiologist will make every effort to sustain my life, unless otherwise directed by [facility] Do Not Resuscitate documents.  Tell my anesthesia doctor before my procedure:  If I am pregnant.  The last time that I ate or drank.  All of the medicines I take (including prescriptions, herbal supplements, and pills I can buy without a prescription (including street drugs/illegal medications). Failure to inform my anesthesiologist about these medicines may increase my risk of anesthetic complications.  If I am allergic to anything or have had a reaction to anesthesia before.  I understand how the anesthesia medicine will help me (benefits).  I understand that with any type of anesthesia medicine there are risks:  The most common risks are: nausea, vomiting, sore throat, muscle soreness, damage to my eyes, mouth, or teeth (from breathing tube placement).  Rare risks include: remembering what happened during my procedure, allergic reactions to medications, injury to my airway, heart, lungs, vision, nerves, or muscles and in extremely rare instances death.  My doctor has explained to me other choices available  to me for my care (alternatives).  Pregnant Patients (“epidural”):  I understand that the risks of having an epidural (medicine given into my back to help control pain during labor), include itching, low blood pressure, difficulty urinating, headache or slowing of the baby’s heart. Very rare risks include infection, bleeding, seizure, irregular heart rhythms and nerve injury.  Regional Anesthesia (“spinal”, “epidural”, & “nerve blocks”):  I understand that rare but potential complications include headache, bleeding, infection, seizure, irregular heart rhythms, and nerve injury.    I can change my mind about having anesthesia services at any time before I get the medicine.    _____________________________________________________________________________  Patient (or Representative) Signature/Relationship to Patient  Date   Time    _____________________________________________________________________________   Name (if used)    Language/Organization   Time    _____________________________________________________________________________  Anesthesiologist Signature     Date   Time  I have discussed the procedure and information above with the patient (or patient’s representative) and answered their questions. The patient or their representative has agreed to have anesthesia services.    _____________________________________________________________________________  Witness        Date   Time  I have verified that the signature is that of the patient or patient’s representative, and that it was signed before the procedure  Patient Name: Valeria Hutchins     : 1970                 Printed: 2024     Medical Record #: [id number]                     Page 2 of 2